# Patient Record
Sex: MALE | Race: OTHER | HISPANIC OR LATINO
[De-identification: names, ages, dates, MRNs, and addresses within clinical notes are randomized per-mention and may not be internally consistent; named-entity substitution may affect disease eponyms.]

---

## 2017-10-03 PROBLEM — Z00.00 ENCOUNTER FOR PREVENTIVE HEALTH EXAMINATION: Status: ACTIVE | Noted: 2017-10-03

## 2017-10-09 ENCOUNTER — APPOINTMENT (OUTPATIENT)
Dept: VASCULAR SURGERY | Facility: CLINIC | Age: 72
End: 2017-10-09
Payer: MEDICARE

## 2017-10-09 VITALS
HEIGHT: 63 IN | SYSTOLIC BLOOD PRESSURE: 130 MMHG | BODY MASS INDEX: 28.35 KG/M2 | DIASTOLIC BLOOD PRESSURE: 64 MMHG | WEIGHT: 160 LBS

## 2017-10-09 DIAGNOSIS — I83.90 ASYMPTOMATIC VARICOSE VEINS OF UNSPECIFIED LOWER EXTREMITY: ICD-10-CM

## 2017-10-09 DIAGNOSIS — Z87.891 PERSONAL HISTORY OF NICOTINE DEPENDENCE: ICD-10-CM

## 2017-10-09 PROCEDURE — 99213 OFFICE O/P EST LOW 20 MIN: CPT

## 2017-10-31 ENCOUNTER — OUTPATIENT (OUTPATIENT)
Dept: OUTPATIENT SERVICES | Facility: HOSPITAL | Age: 72
LOS: 1 days | Discharge: HOME | End: 2017-10-31

## 2017-10-31 DIAGNOSIS — M25.569 PAIN IN UNSPECIFIED KNEE: ICD-10-CM

## 2017-10-31 DIAGNOSIS — M54.2 CERVICALGIA: ICD-10-CM

## 2017-10-31 DIAGNOSIS — M25.519 PAIN IN UNSPECIFIED SHOULDER: ICD-10-CM

## 2017-11-06 ENCOUNTER — OUTPATIENT (OUTPATIENT)
Dept: OUTPATIENT SERVICES | Facility: HOSPITAL | Age: 72
LOS: 1 days | Discharge: HOME | End: 2017-11-06

## 2017-11-07 ENCOUNTER — OUTPATIENT (OUTPATIENT)
Dept: OUTPATIENT SERVICES | Facility: HOSPITAL | Age: 72
LOS: 1 days | Discharge: HOME | End: 2017-11-07

## 2017-11-07 DIAGNOSIS — E11.9 TYPE 2 DIABETES MELLITUS WITHOUT COMPLICATIONS: ICD-10-CM

## 2017-11-07 DIAGNOSIS — Z01.818 ENCOUNTER FOR OTHER PREPROCEDURAL EXAMINATION: ICD-10-CM

## 2017-11-07 DIAGNOSIS — I10 ESSENTIAL (PRIMARY) HYPERTENSION: ICD-10-CM

## 2017-11-07 DIAGNOSIS — Z87.891 PERSONAL HISTORY OF NICOTINE DEPENDENCE: ICD-10-CM

## 2017-11-07 DIAGNOSIS — D21.21 BENIGN NEOPLASM OF CONNECTIVE AND OTHER SOFT TISSUE OF RIGHT LOWER LIMB, INCLUDING HIP: ICD-10-CM

## 2017-11-07 DIAGNOSIS — D68.8 OTHER SPECIFIED COAGULATION DEFECTS: ICD-10-CM

## 2017-11-13 ENCOUNTER — OUTPATIENT (OUTPATIENT)
Dept: OUTPATIENT SERVICES | Facility: HOSPITAL | Age: 72
LOS: 1 days | Discharge: HOME | End: 2017-11-13

## 2017-11-13 DIAGNOSIS — C43.71 MALIGNANT MELANOMA OF RIGHT LOWER LIMB, INCLUDING HIP: ICD-10-CM

## 2017-11-13 DIAGNOSIS — E11.9 TYPE 2 DIABETES MELLITUS WITHOUT COMPLICATIONS: ICD-10-CM

## 2017-11-13 DIAGNOSIS — F17.210 NICOTINE DEPENDENCE, CIGARETTES, UNCOMPLICATED: ICD-10-CM

## 2017-11-13 DIAGNOSIS — I10 ESSENTIAL (PRIMARY) HYPERTENSION: ICD-10-CM

## 2017-11-13 DIAGNOSIS — L98.9 DISORDER OF THE SKIN AND SUBCUTANEOUS TISSUE, UNSPECIFIED: ICD-10-CM

## 2017-11-21 ENCOUNTER — APPOINTMENT (OUTPATIENT)
Dept: PLASTIC SURGERY | Facility: CLINIC | Age: 72
End: 2017-11-21
Payer: MEDICARE

## 2017-11-21 VITALS — WEIGHT: 164 LBS | BODY MASS INDEX: 28 KG/M2 | HEIGHT: 64 IN

## 2017-11-21 DIAGNOSIS — Z80.3 FAMILY HISTORY OF MALIGNANT NEOPLASM OF BREAST: ICD-10-CM

## 2017-11-21 DIAGNOSIS — Z87.39 PERSONAL HISTORY OF OTHER DISEASES OF THE MUSCULOSKELETAL SYSTEM AND CONNECTIVE TISSUE: ICD-10-CM

## 2017-11-21 PROCEDURE — 99203 OFFICE O/P NEW LOW 30 MIN: CPT

## 2017-11-22 ENCOUNTER — APPOINTMENT (OUTPATIENT)
Dept: SURGERY | Facility: CLINIC | Age: 72
End: 2017-11-22
Payer: MEDICARE

## 2017-11-22 VITALS
DIASTOLIC BLOOD PRESSURE: 70 MMHG | HEIGHT: 64 IN | SYSTOLIC BLOOD PRESSURE: 156 MMHG | BODY MASS INDEX: 28.85 KG/M2 | HEART RATE: 84 BPM | TEMPERATURE: 98.1 F | WEIGHT: 169 LBS

## 2017-11-22 DIAGNOSIS — Z86.39 PERSONAL HISTORY OF OTHER ENDOCRINE, NUTRITIONAL AND METABOLIC DISEASE: ICD-10-CM

## 2017-11-22 PROCEDURE — 99214 OFFICE O/P EST MOD 30 MIN: CPT

## 2017-11-23 ENCOUNTER — MOBILE ON CALL (OUTPATIENT)
Age: 72
End: 2017-11-23

## 2017-11-24 ENCOUNTER — MOBILE ON CALL (OUTPATIENT)
Age: 72
End: 2017-11-24

## 2017-11-30 ENCOUNTER — OUTPATIENT (OUTPATIENT)
Dept: OUTPATIENT SERVICES | Facility: HOSPITAL | Age: 72
LOS: 1 days | Discharge: HOME | End: 2017-11-30

## 2017-11-30 DIAGNOSIS — Z01.818 ENCOUNTER FOR OTHER PREPROCEDURAL EXAMINATION: ICD-10-CM

## 2017-11-30 DIAGNOSIS — C43.71 MALIGNANT MELANOMA OF RIGHT LOWER LIMB, INCLUDING HIP: ICD-10-CM

## 2017-11-30 DIAGNOSIS — Z02.9 ENCOUNTER FOR ADMINISTRATIVE EXAMINATIONS, UNSPECIFIED: ICD-10-CM

## 2017-12-01 DIAGNOSIS — C80.1 MALIGNANT (PRIMARY) NEOPLASM, UNSPECIFIED: ICD-10-CM

## 2017-12-01 DIAGNOSIS — E11.9 TYPE 2 DIABETES MELLITUS WITHOUT COMPLICATIONS: ICD-10-CM

## 2017-12-01 DIAGNOSIS — E78.00 PURE HYPERCHOLESTEROLEMIA, UNSPECIFIED: ICD-10-CM

## 2017-12-01 DIAGNOSIS — I10 ESSENTIAL (PRIMARY) HYPERTENSION: ICD-10-CM

## 2017-12-02 PROBLEM — Z86.39 HISTORY OF DIABETES MELLITUS: Status: RESOLVED | Noted: 2017-12-02 | Resolved: 2017-12-02

## 2017-12-14 ENCOUNTER — OUTPATIENT (OUTPATIENT)
Dept: OUTPATIENT SERVICES | Facility: HOSPITAL | Age: 72
LOS: 1 days | Discharge: HOME | End: 2017-12-14

## 2017-12-14 DIAGNOSIS — C43.71 MALIGNANT MELANOMA OF RIGHT LOWER LIMB, INCLUDING HIP: ICD-10-CM

## 2017-12-14 DIAGNOSIS — R59.9 ENLARGED LYMPH NODES, UNSPECIFIED: ICD-10-CM

## 2017-12-14 DIAGNOSIS — E11.9 TYPE 2 DIABETES MELLITUS WITHOUT COMPLICATIONS: ICD-10-CM

## 2017-12-14 DIAGNOSIS — I10 ESSENTIAL (PRIMARY) HYPERTENSION: ICD-10-CM

## 2017-12-20 ENCOUNTER — APPOINTMENT (OUTPATIENT)
Dept: SURGERY | Facility: CLINIC | Age: 72
End: 2017-12-20
Payer: MEDICARE

## 2017-12-20 VITALS
WEIGHT: 170 LBS | HEART RATE: 105 BPM | TEMPERATURE: 98.9 F | SYSTOLIC BLOOD PRESSURE: 162 MMHG | HEIGHT: 64 IN | DIASTOLIC BLOOD PRESSURE: 72 MMHG | BODY MASS INDEX: 29.02 KG/M2

## 2017-12-20 PROCEDURE — 99024 POSTOP FOLLOW-UP VISIT: CPT

## 2018-01-02 ENCOUNTER — APPOINTMENT (OUTPATIENT)
Dept: PLASTIC SURGERY | Facility: CLINIC | Age: 73
End: 2018-01-02
Payer: MEDICARE

## 2018-01-02 PROCEDURE — 99212 OFFICE O/P EST SF 10 MIN: CPT

## 2018-01-04 ENCOUNTER — OUTPATIENT (OUTPATIENT)
Dept: OUTPATIENT SERVICES | Facility: HOSPITAL | Age: 73
LOS: 1 days | Discharge: HOME | End: 2018-01-04

## 2018-01-04 DIAGNOSIS — S91.301D UNSPECIFIED OPEN WOUND, RIGHT FOOT, SUBSEQUENT ENCOUNTER: ICD-10-CM

## 2018-01-04 DIAGNOSIS — I44.1 ATRIOVENTRICULAR BLOCK, SECOND DEGREE: ICD-10-CM

## 2018-01-04 DIAGNOSIS — C43.71 MALIGNANT MELANOMA OF RIGHT LOWER LIMB, INCLUDING HIP: ICD-10-CM

## 2018-01-04 DIAGNOSIS — Z01.818 ENCOUNTER FOR OTHER PREPROCEDURAL EXAMINATION: ICD-10-CM

## 2018-01-11 ENCOUNTER — APPOINTMENT (OUTPATIENT)
Dept: PLASTIC SURGERY | Facility: CLINIC | Age: 73
End: 2018-01-11
Payer: MEDICARE

## 2018-01-11 ENCOUNTER — OUTPATIENT (OUTPATIENT)
Dept: OUTPATIENT SERVICES | Facility: HOSPITAL | Age: 73
LOS: 1 days | Discharge: HOME | End: 2018-01-11

## 2018-01-11 DIAGNOSIS — I44.1 ATRIOVENTRICULAR BLOCK, SECOND DEGREE: ICD-10-CM

## 2018-01-11 PROCEDURE — 15002 WOUND PREP TRK/ARM/LEG: CPT

## 2018-01-11 PROCEDURE — 15275 SKIN SUB GRAFT FACE/NK/HF/G: CPT | Mod: XE

## 2018-01-11 PROCEDURE — 97605 NEG PRS WND THER DME<=50SQCM: CPT | Mod: XE

## 2018-01-23 ENCOUNTER — APPOINTMENT (OUTPATIENT)
Dept: PLASTIC SURGERY | Facility: CLINIC | Age: 73
End: 2018-01-23
Payer: MEDICARE

## 2018-01-23 PROCEDURE — 99024 POSTOP FOLLOW-UP VISIT: CPT

## 2018-01-25 DIAGNOSIS — Z85.828 PERSONAL HISTORY OF OTHER MALIGNANT NEOPLASM OF SKIN: ICD-10-CM

## 2018-01-30 ENCOUNTER — APPOINTMENT (OUTPATIENT)
Dept: PLASTIC SURGERY | Facility: CLINIC | Age: 73
End: 2018-01-30
Payer: MEDICARE

## 2018-01-30 PROCEDURE — 99024 POSTOP FOLLOW-UP VISIT: CPT

## 2018-01-31 ENCOUNTER — INPATIENT (INPATIENT)
Facility: HOSPITAL | Age: 73
LOS: 1 days | Discharge: HOME | End: 2018-02-02
Attending: INTERNAL MEDICINE

## 2018-01-31 ENCOUNTER — APPOINTMENT (OUTPATIENT)
Dept: PLASTIC SURGERY | Facility: AMBULATORY SURGERY CENTER | Age: 73
End: 2018-01-31
Payer: MEDICARE

## 2018-01-31 PROCEDURE — 15002 WOUND PREP TRK/ARM/LEG: CPT | Mod: 58,59

## 2018-01-31 PROCEDURE — 15120 SPLT AGRFT F/S/N/H/F/G/M 1ST: CPT | Mod: 58

## 2018-02-04 DIAGNOSIS — I44.1 ATRIOVENTRICULAR BLOCK, SECOND DEGREE: ICD-10-CM

## 2018-02-06 DIAGNOSIS — C43.71 MALIGNANT MELANOMA OF RIGHT LOWER LIMB, INCLUDING HIP: ICD-10-CM

## 2018-02-06 DIAGNOSIS — I10 ESSENTIAL (PRIMARY) HYPERTENSION: ICD-10-CM

## 2018-02-06 DIAGNOSIS — I97.89 OTHER POSTPROCEDURAL COMPLICATIONS AND DISORDERS OF THE CIRCULATORY SYSTEM, NOT ELSEWHERE CLASSIFIED: ICD-10-CM

## 2018-02-06 DIAGNOSIS — I83.90 ASYMPTOMATIC VARICOSE VEINS OF UNSPECIFIED LOWER EXTREMITY: ICD-10-CM
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
## 2018-02-06 DIAGNOSIS — M19.90 UNSPECIFIED OSTEOARTHRITIS, UNSPECIFIED SITE: ICD-10-CM

## 2018-02-06 DIAGNOSIS — Z87.891 PERSONAL HISTORY OF NICOTINE DEPENDENCE: ICD-10-CM

## 2018-02-06 DIAGNOSIS — E11.9 TYPE 2 DIABETES MELLITUS WITHOUT COMPLICATIONS: ICD-10-CM

## 2018-02-06 DIAGNOSIS — I44.1 ATRIOVENTRICULAR BLOCK, SECOND DEGREE: ICD-10-CM

## 2018-02-07 ENCOUNTER — APPOINTMENT (OUTPATIENT)
Dept: PLASTIC SURGERY | Facility: CLINIC | Age: 73
End: 2018-02-07
Payer: MEDICARE

## 2018-02-07 PROCEDURE — 99024 POSTOP FOLLOW-UP VISIT: CPT

## 2018-02-12 DIAGNOSIS — I44.1 ATRIOVENTRICULAR BLOCK, SECOND DEGREE: ICD-10-CM

## 2018-02-12 DIAGNOSIS — Z79.84 LONG TERM (CURRENT) USE OF ORAL HYPOGLYCEMIC DRUGS: ICD-10-CM

## 2018-02-15 ENCOUNTER — APPOINTMENT (OUTPATIENT)
Dept: PLASTIC SURGERY | Facility: CLINIC | Age: 73
End: 2018-02-15
Payer: MEDICARE

## 2018-02-15 PROCEDURE — 99024 POSTOP FOLLOW-UP VISIT: CPT

## 2018-03-05 ENCOUNTER — APPOINTMENT (OUTPATIENT)
Dept: CARDIOLOGY | Facility: CLINIC | Age: 73
End: 2018-03-05

## 2018-03-05 VITALS
OXYGEN SATURATION: 97 % | WEIGHT: 170 LBS | HEART RATE: 83 BPM | SYSTOLIC BLOOD PRESSURE: 151 MMHG | DIASTOLIC BLOOD PRESSURE: 73 MMHG | BODY MASS INDEX: 29.18 KG/M2

## 2018-03-05 RX ORDER — ASPIRIN 81 MG
81 TABLET, DELAYED RELEASE (ENTERIC COATED) ORAL
Refills: 0 | Status: DISCONTINUED | COMMUNITY
End: 2018-03-05

## 2018-03-09 ENCOUNTER — APPOINTMENT (OUTPATIENT)
Dept: PLASTIC SURGERY | Facility: CLINIC | Age: 73
End: 2018-03-09
Payer: MEDICARE

## 2018-03-09 PROCEDURE — 99024 POSTOP FOLLOW-UP VISIT: CPT

## 2018-03-14 ENCOUNTER — APPOINTMENT (OUTPATIENT)
Dept: CARDIOLOGY | Facility: CLINIC | Age: 73
End: 2018-03-14

## 2018-03-14 VITALS
SYSTOLIC BLOOD PRESSURE: 149 MMHG | HEIGHT: 64 IN | HEART RATE: 96 BPM | BODY MASS INDEX: 29.02 KG/M2 | WEIGHT: 170 LBS | DIASTOLIC BLOOD PRESSURE: 75 MMHG | OXYGEN SATURATION: 97 %

## 2018-04-10 ENCOUNTER — APPOINTMENT (OUTPATIENT)
Dept: PLASTIC SURGERY | Facility: CLINIC | Age: 73
End: 2018-04-10
Payer: MEDICARE

## 2018-04-10 PROCEDURE — 99024 POSTOP FOLLOW-UP VISIT: CPT

## 2018-04-11 ENCOUNTER — LABORATORY RESULT (OUTPATIENT)
Age: 73
End: 2018-04-11

## 2018-04-11 ENCOUNTER — APPOINTMENT (OUTPATIENT)
Dept: SURGERY | Facility: CLINIC | Age: 73
End: 2018-04-11
Payer: MEDICARE

## 2018-04-11 ENCOUNTER — OUTPATIENT (OUTPATIENT)
Dept: OUTPATIENT SERVICES | Facility: HOSPITAL | Age: 73
LOS: 1 days | Discharge: HOME | End: 2018-04-11

## 2018-04-11 VITALS
TEMPERATURE: 98.8 F | BODY MASS INDEX: 29.71 KG/M2 | DIASTOLIC BLOOD PRESSURE: 68 MMHG | WEIGHT: 174 LBS | SYSTOLIC BLOOD PRESSURE: 158 MMHG | HEIGHT: 64 IN | HEART RATE: 112 BPM

## 2018-04-11 DIAGNOSIS — C43.71 MALIGNANT MELANOMA OF RIGHT LOWER LIMB, INCLUDING HIP: ICD-10-CM

## 2018-04-11 PROCEDURE — 99213 OFFICE O/P EST LOW 20 MIN: CPT

## 2018-04-13 LAB
ALBUMIN SERPL ELPH-MCNC: 4.5 G/DL
ALP BLD-CCNC: 63 U/L
ALT SERPL-CCNC: 14 U/L
ANION GAP SERPL CALC-SCNC: 13 MMOL/L
AST SERPL-CCNC: 14 U/L
BILIRUB SERPL-MCNC: 0.3 MG/DL
BUN SERPL-MCNC: 21 MG/DL
CALCIUM SERPL-MCNC: 9.9 MG/DL
CHLORIDE SERPL-SCNC: 94 MMOL/L
CO2 SERPL-SCNC: 31 MMOL/L
CREAT SERPL-MCNC: 1 MG/DL
GLUCOSE SERPL-MCNC: 274 MG/DL
LDH SERPL-CCNC: 206 U/L
POTASSIUM SERPL-SCNC: 4.3 MMOL/L
PROT SERPL-MCNC: 7.2 G/DL
SODIUM SERPL-SCNC: 138 MMOL/L

## 2018-04-16 ENCOUNTER — APPOINTMENT (OUTPATIENT)
Dept: CARDIOLOGY | Facility: CLINIC | Age: 73
End: 2018-04-16

## 2018-10-16 ENCOUNTER — APPOINTMENT (OUTPATIENT)
Dept: CARDIOLOGY | Facility: CLINIC | Age: 73
End: 2018-10-16

## 2018-10-16 ENCOUNTER — APPOINTMENT (OUTPATIENT)
Dept: PLASTIC SURGERY | Facility: CLINIC | Age: 73
End: 2018-10-16
Payer: MEDICARE

## 2018-10-16 VITALS
DIASTOLIC BLOOD PRESSURE: 66 MMHG | HEART RATE: 79 BPM | BODY MASS INDEX: 29.88 KG/M2 | OXYGEN SATURATION: 97 % | SYSTOLIC BLOOD PRESSURE: 116 MMHG | WEIGHT: 175 LBS | HEIGHT: 64 IN

## 2018-10-16 PROCEDURE — 99212 OFFICE O/P EST SF 10 MIN: CPT

## 2018-10-16 RX ORDER — CELECOXIB 200 MG/1
200 CAPSULE ORAL
Refills: 0 | Status: DISCONTINUED | COMMUNITY
End: 2018-10-16

## 2018-10-17 ENCOUNTER — APPOINTMENT (OUTPATIENT)
Dept: SURGERY | Facility: CLINIC | Age: 73
End: 2018-10-17
Payer: MEDICARE

## 2018-10-17 VITALS
SYSTOLIC BLOOD PRESSURE: 132 MMHG | DIASTOLIC BLOOD PRESSURE: 84 MMHG | TEMPERATURE: 98.1 F | HEIGHT: 64 IN | BODY MASS INDEX: 28.85 KG/M2 | HEART RATE: 97 BPM | WEIGHT: 169 LBS

## 2018-10-17 PROCEDURE — 99213 OFFICE O/P EST LOW 20 MIN: CPT

## 2018-10-18 ENCOUNTER — OUTPATIENT (OUTPATIENT)
Dept: OUTPATIENT SERVICES | Facility: HOSPITAL | Age: 73
LOS: 1 days | Discharge: HOME | End: 2018-10-18

## 2018-10-18 ENCOUNTER — LABORATORY RESULT (OUTPATIENT)
Age: 73
End: 2018-10-18

## 2018-10-18 DIAGNOSIS — E11.9 TYPE 2 DIABETES MELLITUS WITHOUT COMPLICATIONS: ICD-10-CM

## 2018-10-18 DIAGNOSIS — C43.71 MALIGNANT MELANOMA OF RIGHT LOWER LIMB, INCLUDING HIP: ICD-10-CM

## 2018-10-19 LAB
ALBUMIN SERPL ELPH-MCNC: 4.4 G/DL
ALP BLD-CCNC: 57 U/L
ALT SERPL-CCNC: 17 U/L
ANION GAP SERPL CALC-SCNC: 16 MMOL/L
AST SERPL-CCNC: 16 U/L
BILIRUB SERPL-MCNC: 0.3 MG/DL
BUN SERPL-MCNC: 15 MG/DL
CALCIUM SERPL-MCNC: 9.3 MG/DL
CHLORIDE SERPL-SCNC: 98 MMOL/L
CO2 SERPL-SCNC: 25 MMOL/L
CREAT SERPL-MCNC: 0.9 MG/DL
GLUCOSE SERPL-MCNC: 170 MG/DL
LDH SERPL-CCNC: 254 U/L
POTASSIUM SERPL-SCNC: 4.5 MMOL/L
PROT SERPL-MCNC: 7.5 G/DL
SODIUM SERPL-SCNC: 139 MMOL/L

## 2018-10-23 ENCOUNTER — OUTPATIENT (OUTPATIENT)
Dept: OUTPATIENT SERVICES | Facility: HOSPITAL | Age: 73
LOS: 1 days | Discharge: HOME | End: 2018-10-23

## 2018-10-23 DIAGNOSIS — Z12.31 ENCOUNTER FOR SCREENING MAMMOGRAM FOR MALIGNANT NEOPLASM OF BREAST: ICD-10-CM

## 2018-10-24 DIAGNOSIS — M89.9 DISORDER OF BONE, UNSPECIFIED: ICD-10-CM

## 2018-10-24 DIAGNOSIS — Z13.820 ENCOUNTER FOR SCREENING FOR OSTEOPOROSIS: ICD-10-CM

## 2018-11-05 ENCOUNTER — FORM ENCOUNTER (OUTPATIENT)
Age: 73
End: 2018-11-05

## 2018-11-06 ENCOUNTER — OUTPATIENT (OUTPATIENT)
Dept: OUTPATIENT SERVICES | Facility: HOSPITAL | Age: 73
LOS: 1 days | Discharge: HOME | End: 2018-11-06

## 2018-11-06 DIAGNOSIS — C43.71 MALIGNANT MELANOMA OF RIGHT LOWER LIMB, INCLUDING HIP: ICD-10-CM

## 2018-11-06 LAB — GLUCOSE BLDC GLUCOMTR-MCNC: 152 MG/DL — HIGH (ref 70–99)

## 2019-05-15 ENCOUNTER — APPOINTMENT (OUTPATIENT)
Dept: SURGERY | Facility: CLINIC | Age: 74
End: 2019-05-15
Payer: MEDICARE

## 2019-05-15 ENCOUNTER — OUTPATIENT (OUTPATIENT)
Dept: OUTPATIENT SERVICES | Facility: HOSPITAL | Age: 74
LOS: 1 days | Discharge: HOME | End: 2019-05-15

## 2019-05-15 VITALS
DIASTOLIC BLOOD PRESSURE: 66 MMHG | WEIGHT: 164 LBS | BODY MASS INDEX: 28 KG/M2 | HEART RATE: 99 BPM | TEMPERATURE: 97.7 F | HEIGHT: 64 IN | SYSTOLIC BLOOD PRESSURE: 138 MMHG

## 2019-05-15 DIAGNOSIS — C43.71 MALIGNANT MELANOMA OF RIGHT LOWER LIMB, INCLUDING HIP: ICD-10-CM

## 2019-05-15 PROCEDURE — 99214 OFFICE O/P EST MOD 30 MIN: CPT

## 2019-05-16 ENCOUNTER — APPOINTMENT (OUTPATIENT)
Dept: PLASTIC SURGERY | Facility: CLINIC | Age: 74
End: 2019-05-16
Payer: MEDICARE

## 2019-05-16 LAB
ALBUMIN SERPL ELPH-MCNC: 4.4 G/DL
ALP BLD-CCNC: 60 U/L
ALT SERPL-CCNC: 26 U/L
ANION GAP SERPL CALC-SCNC: 15 MMOL/L
AST SERPL-CCNC: 22 U/L
BILIRUB SERPL-MCNC: 0.2 MG/DL
BUN SERPL-MCNC: 18 MG/DL
CALCIUM SERPL-MCNC: 9.1 MG/DL
CHLORIDE SERPL-SCNC: 102 MMOL/L
CO2 SERPL-SCNC: 24 MMOL/L
CREAT SERPL-MCNC: 0.9 MG/DL
GLUCOSE SERPL-MCNC: 192 MG/DL
LDH SERPL-CCNC: 209 U/L
POTASSIUM SERPL-SCNC: 4.4 MMOL/L
PROT SERPL-MCNC: 6.9 G/DL
SODIUM SERPL-SCNC: 141 MMOL/L

## 2019-05-16 PROCEDURE — 99212 OFFICE O/P EST SF 10 MIN: CPT

## 2019-05-16 NOTE — ASSESSMENT
[FreeTextEntry1] : Here in followup s/p right heel reconstruction after excision of melanoma w Viv Dec 2017\par \par graft healed--small area epidermal cracking approx 3mm\par getting diabetic shoes w Mura later today\par \par suggest medihoney to small opening rigth heel\par no acute plastic surgery issues\par f/u in 6 months

## 2019-05-18 NOTE — PHYSICAL EXAM
[Normal] : supple, no neck mass and thyroid not enlarged [Normal Neck Lymph Nodes] : normal neck lymph nodes  [Normal] : grossly intact [Normal Supraclavicular Lymph Nodes] : normal supraclavicular lymph nodes [de-identified] : right groin LNs, small, rubbery,  Appeared slightly larger than before. [de-identified] : Right heel wound well-healed after skin graft. No evidence of recurrence. Right groin scar well-healed. No intransit mets or satellitosis.

## 2019-05-18 NOTE — ASSESSMENT
[FreeTextEntry1] : 75 yo male patient who comes today after he underwent excisional biopsy of a right foot hypermelanotic skin lesion. The final pathology report revealed a pT3b, pNx 2.5 mm thick, with ulceration, acrolentiginous melanoma of the heel, resected with inadequate margins. He underwent wide excision of melanoma of his right heel on December 14, 2017 with negative margins of resection. We performed a SLNB of his right groin and both lymph nodes were negative for tumor. He comes today for followup and he refers minor discomfort in his foot when he walks.\par \par Assessment and Plan:\par \par s/p wide excision of right heel 2.5 mm thick melanoma, R0 resection.\par S/p STSG by Dr Gonzalez.\par \par Currently FLORENTIN, clinically.\par Imaging last year negative, CT-PET in November 2019, negative. LDH was 254.\par LDH and CMP ordered.\par US groin ordered.\par \par Return in 6 months for followup.\par \par All the questions were answered to their satisfaction and I encourage the patient to call my office at anytime if he had any questions. Plan of care fully discussed with the patient.\par

## 2019-05-18 NOTE — HISTORY OF PRESENT ILLNESS
[de-identified] : 73 yo male patient who comes today after he underwent excisional biopsy of a right foot hypermelanotic skin lesion. The final pathology report revealed a pT3b, pNx 2.5 mm thick, with ulceration, acrolentiginous melanoma of the heel, resected with inadequate margins. He underwent wide excision of melanoma of his right heel on December 14, 2017 with negative margins of resection. We performed a SLNB of his right groin and both lymph nodes were negative for tumor. He comes today for followup and he refers minor discomfort in his foot when he walks.

## 2019-05-20 ENCOUNTER — APPOINTMENT (OUTPATIENT)
Dept: CARDIOLOGY | Facility: CLINIC | Age: 74
End: 2019-05-20
Payer: MEDICARE

## 2019-05-20 ENCOUNTER — OUTPATIENT (OUTPATIENT)
Dept: OUTPATIENT SERVICES | Facility: HOSPITAL | Age: 74
LOS: 1 days | Discharge: HOME | End: 2019-05-20
Payer: MEDICARE

## 2019-05-20 VITALS
HEIGHT: 64 IN | DIASTOLIC BLOOD PRESSURE: 75 MMHG | WEIGHT: 164 LBS | HEART RATE: 68 BPM | BODY MASS INDEX: 28 KG/M2 | OXYGEN SATURATION: 98 % | SYSTOLIC BLOOD PRESSURE: 144 MMHG

## 2019-05-20 DIAGNOSIS — I44.0 ATRIOVENTRICULAR BLOCK, FIRST DEGREE: ICD-10-CM

## 2019-05-20 DIAGNOSIS — M25.511 PAIN IN RIGHT SHOULDER: ICD-10-CM

## 2019-05-20 DIAGNOSIS — M25.512 PAIN IN LEFT SHOULDER: ICD-10-CM

## 2019-05-20 DIAGNOSIS — E11.9 TYPE 2 DIABETES MELLITUS W/OUT COMPLICATIONS: ICD-10-CM

## 2019-05-20 DIAGNOSIS — C43.71 MALIGNANT MELANOMA OF RIGHT LOWER LIMB, INCLUDING HIP: ICD-10-CM

## 2019-05-20 PROCEDURE — 73030 X-RAY EXAM OF SHOULDER: CPT | Mod: 26,50

## 2019-05-20 PROCEDURE — 93000 ELECTROCARDIOGRAM COMPLETE: CPT | Mod: 59

## 2019-05-20 PROCEDURE — 99213 OFFICE O/P EST LOW 20 MIN: CPT

## 2019-05-20 PROCEDURE — 76856 US EXAM PELVIC COMPLETE: CPT | Mod: 26

## 2019-05-20 PROCEDURE — 93280 PM DEVICE PROGR EVAL DUAL: CPT

## 2019-07-17 PROBLEM — I44.0 FIRST DEGREE AV BLOCK: Status: ACTIVE | Noted: 2019-07-17

## 2019-07-17 PROBLEM — E11.9 DIABETES: Status: ACTIVE | Noted: 2017-10-09

## 2019-07-17 NOTE — PHYSICAL EXAM
[General Appearance - Well Developed] : well developed [Normal Appearance] : normal appearance [Well Groomed] : well groomed [General Appearance - Well Nourished] : well nourished [No Deformities] : no deformities [General Appearance - In No Acute Distress] : no acute distress [Heart Rate And Rhythm] : heart rate and rhythm were normal [Heart Sounds] : normal S1 and S2 [Murmurs] : no murmurs present [Respiration, Rhythm And Depth] : normal respiratory rhythm and effort [Exaggerated Use Of Accessory Muscles For Inspiration] : no accessory muscle use [Auscultation Breath Sounds / Voice Sounds] : lungs were clear to auscultation bilaterally [Left Infraclavicular] : left infraclavicular area [Clean] : clean [Dry] : dry [Well-Healed] : well-healed [Abdomen Soft] : soft [Abdomen Tenderness] : non-tender [] : no hepato-splenomegaly [Abdomen Mass (___ Cm)] : no abdominal mass palpated [Nail Clubbing] : no clubbing of the fingernails [Cyanosis, Localized] : no localized cyanosis [Petechial Hemorrhages (___cm)] : no petechial hemorrhages

## 2019-07-17 NOTE — DISCUSSION/SUMMARY
[FreeTextEntry1] : Mr. 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 He has minimal symptoms. He is not on AV guillermo agents and he has no apparent reversible cause of AV block. He has normal LV function on echo. He underwent the placement of a dual chamber pacemaker and removal of implantable loop recorder on 2/1/2018. \par \par Deice interrogation shows PAF. His CHADSVASC score is 4. I recommend to continue him on Eliquis. I explained rationale for anticoagulation, risk ans benefits. Patient expressed understanding and agreement with plan of care. PAtient has no evidence of bleeding. \par \par I interrogated and reprogrammed his device as described in procedure. His wound is healing properly, with no signs of inflammation or bleeding. I discussed with patient post-operative care in great details. I answered all his questions to his satisfaction. Patient was pleased with the result of his procedure. \par \par Patient is travelling to [facility] for 6-8 months; There will be no remote transmissions for that period.\par \par Patient will follow with me in 6 months’ time. Please do not hesitate to contact me at [phone] if you have any further questions regarding this patient care.\par

## 2019-07-17 NOTE — PROCEDURE
[No] : not [NSR] : normal sinus rhythm [Pacemaker] : pacemaker [DDI] : DDI [Voltage: ___ volts] : Voltage was [unfilled] volts [Magnet Rate: ___ Ppm] : magnet rate was [unfilled] Ppm [Longevity: ___ months] : The estimated remaining battery life is [unfilled] months [Normal] : The battery status is normal. [Threshold Testing Performed] : Threshold testing was performed [Ventricular] : Ventricular [Lead Imp:  ___ohms] : lead impedance was [unfilled] ohms [Sensing Amplitude ___mv] : sensing amplitude was [unfilled] mv [___V @] : [unfilled] V [___ ms] : [unfilled] ms [Asense-Vsense ___ %] : Asense-Vsense [unfilled]% [Asense-Vpace ___ %] : Asense-Vpace [unfilled]% [Apace-Vsense ___ %] : Apace-Vsense [unfilled]% [Apace-Vpace ___ %] : Apace-Vpace [unfilled]% [Programmed for Longevity] : output reprogrammed for improved battery longevity [de-identified] : ABBOTT [de-identified] : [id number] [de-identified] : [id number] [de-identified] : 2/1/2018 [de-identified] : 60 [de-identified] : A SENSITIVITY TO 0.5 mV, A-CAP CONFIRM OFF, MAX TRACK RATE , DDI ON AMS, AMS BASE RATE TO 60, PVAB , PVARP , SENSED AV DELAY . [de-identified] : FAR FIELD R-WAVE SENSING NOTED IN ATRIAL LEAD. \par >16 EPISODES OF AF, LONGEST 1 DAY 7 HRS.: \par NORMAL DDD PPM SYSTEM FUNCTION TODAY.

## 2019-07-17 NOTE — HISTORY OF PRESENT ILLNESS
[Palpitations] : no palpitations [SOB] : no dyspnea [Syncope] : no syncope [Dizziness] : no dizziness [Chest Pain] : no chest pain or discomfort [Shoulder Pain] : no shoulder pain [Pain at Site] : no pain at device site [Erythema at Site] : no erythema at device site [Swelling at Site] : no swelling at device site [de-identified] : \par \par Referring Physician: Dr. New Franco\par \par \par CARDIAC TESTING:\par ECG (5/20/2019): sinus rhythm, first degree av block, intermittent RV pacing\par ECG (3/5/2018): sinus rhythm at 84 bm, 1st degree AV block, [place]>400 ms

## 2019-08-07 ENCOUNTER — APPOINTMENT (OUTPATIENT)
Dept: SURGERY | Facility: CLINIC | Age: 74
End: 2019-08-07

## 2019-09-12 ENCOUNTER — APPOINTMENT (OUTPATIENT)
Dept: CARDIOLOGY | Facility: CLINIC | Age: 74
End: 2019-09-12

## 2019-12-09 ENCOUNTER — APPOINTMENT (OUTPATIENT)
Dept: CARDIOLOGY | Facility: CLINIC | Age: 74
End: 2019-12-09

## 2020-03-02 ENCOUNTER — APPOINTMENT (OUTPATIENT)
Dept: SURGERY | Facility: CLINIC | Age: 75
End: 2020-03-02
Payer: MEDICARE

## 2020-03-02 VITALS
DIASTOLIC BLOOD PRESSURE: 78 MMHG | WEIGHT: 160 LBS | SYSTOLIC BLOOD PRESSURE: 144 MMHG | TEMPERATURE: 97.9 F | HEART RATE: 77 BPM | BODY MASS INDEX: 27.31 KG/M2 | HEIGHT: 64 IN

## 2020-03-02 PROCEDURE — 99214 OFFICE O/P EST MOD 30 MIN: CPT

## 2020-03-03 ENCOUNTER — APPOINTMENT (OUTPATIENT)
Dept: PLASTIC SURGERY | Facility: CLINIC | Age: 75
End: 2020-03-03
Payer: MEDICARE

## 2020-03-03 PROCEDURE — 99212 OFFICE O/P EST SF 10 MIN: CPT

## 2020-03-03 NOTE — ASSESSMENT
[FreeTextEntry1] : Here in followup s/p right heel reconstruction after excision of melanoma w Viv Dec 2017\par \par graft healed--small area epidermal cracking approx 3mm\par getting diabetic shoes w Mura later today\par \par suggest medihoney to small opening rigth heel\par no acute plastic surgery issues\par f/u in 6 months\par \par as above\par right heel fine, well healed STSG over Interga 2 yrs postop\par has firmness and tenderness over distal lateral rigth calf\par no popliteal or inguinal LAD\par \par Antwan ordered CT scan yesterday\par recently saw Viv\par \par pt is to f/u w Antwan re CT results

## 2020-03-06 LAB
ALBUMIN SERPL ELPH-MCNC: 4.3 G/DL
ALP BLD-CCNC: 70 U/L
ALT SERPL-CCNC: 14 U/L
ANION GAP SERPL CALC-SCNC: 10 MMOL/L
AST SERPL-CCNC: 15 U/L
BASOPHILS # BLD AUTO: 0.04 K/UL
BASOPHILS NFR BLD AUTO: 0.5 %
BILIRUB SERPL-MCNC: 0.3 MG/DL
BUN SERPL-MCNC: 12 MG/DL
CALCIUM SERPL-MCNC: 9.3 MG/DL
CHLORIDE SERPL-SCNC: 101 MMOL/L
CO2 SERPL-SCNC: 29 MMOL/L
CREAT SERPL-MCNC: 0.8 MG/DL
EOSINOPHIL # BLD AUTO: 0.1 K/UL
EOSINOPHIL NFR BLD AUTO: 1.2 %
GLUCOSE SERPL-MCNC: 206 MG/DL
HCT VFR BLD CALC: 42.4 %
HGB BLD-MCNC: 13.7 G/DL
IMM GRANULOCYTES NFR BLD AUTO: 0.4 %
LDH SERPL-CCNC: 180 U/L
LYMPHOCYTES # BLD AUTO: 2.57 K/UL
LYMPHOCYTES NFR BLD AUTO: 30.7 %
MAN DIFF?: NORMAL
MCHC RBC-ENTMCNC: 30.8 PG
MCHC RBC-ENTMCNC: 32.3 G/DL
MCV RBC AUTO: 95.3 FL
MONOCYTES # BLD AUTO: 0.62 K/UL
MONOCYTES NFR BLD AUTO: 7.4 %
NEUTROPHILS # BLD AUTO: 5 K/UL
NEUTROPHILS NFR BLD AUTO: 59.8 %
PLATELET # BLD AUTO: 256 K/UL
POTASSIUM SERPL-SCNC: 4.2 MMOL/L
PROT SERPL-MCNC: 6.9 G/DL
RBC # BLD: 4.45 M/UL
RBC # FLD: 12.8 %
SODIUM SERPL-SCNC: 140 MMOL/L
WBC # FLD AUTO: 8.36 K/UL

## 2020-03-06 NOTE — ASSESSMENT
[FreeTextEntry1] : 73 yo male patient who comes today after he underwent excisional biopsy of a right foot hypermelanotic skin lesion. The final pathology report revealed a pT3b, pNx 2.5 mm thick, with ulceration, acrolentiginous melanoma of the heel, resected with inadequate margins. He underwent wide excision of melanoma of his right heel on December 14, 2017 with negative margins of resection. We performed a SLNB of his right groin and both lymph nodes were negative for tumor. He comes today for followup and he refers minor discomfort in his foot when he walks. Today he complains of pain in his distal right leg, denies any other symptoms.\par \par Assessment and Plan:\par \par s/p wide excision of right heel 2.5 mm thick melanoma, R0 resection. S/p STSG by Dr Gonzalez.\par Currently FLORENTIN, clinically.\par \par CT-PET in November 2018, negative. LDH was normal May 2019..\par CBC, LDH and CMP ordered.\par Referred to podiatry.\par Return in 6 months for followup.\par \par All the questions were answered to their satisfaction and I encourage the patient to call my office at anytime if he had any questions. Plan of care fully discussed with the patient.\par

## 2020-03-06 NOTE — PHYSICAL EXAM
[Normal] : supple, no neck mass and thyroid not enlarged [Normal Neck Lymph Nodes] : normal neck lymph nodes  [Normal Supraclavicular Lymph Nodes] : normal supraclavicular lymph nodes [Normal] : oriented to person, place and time, with appropriate affect [de-identified] : right groin LNs, small, rubbery. [de-identified] : Tenderness in distal third of his right leg, fullness, no solid mass. [de-identified] : Right heel wound well-healed after skin graft. No evidence of recurrence. Right groin scar well-healed. No intransit mets or satellitosis.

## 2020-03-09 ENCOUNTER — APPOINTMENT (OUTPATIENT)
Dept: CARDIOLOGY | Facility: CLINIC | Age: 75
End: 2020-03-09
Payer: MEDICARE

## 2020-03-09 VITALS
WEIGHT: 160 LBS | DIASTOLIC BLOOD PRESSURE: 72 MMHG | TEMPERATURE: 98.6 F | HEIGHT: 64 IN | BODY MASS INDEX: 27.31 KG/M2 | HEART RATE: 77 BPM | SYSTOLIC BLOOD PRESSURE: 149 MMHG

## 2020-03-09 PROCEDURE — 93280 PM DEVICE PROGR EVAL DUAL: CPT

## 2020-03-09 PROCEDURE — 99213 OFFICE O/P EST LOW 20 MIN: CPT

## 2020-03-09 PROCEDURE — 93000 ELECTROCARDIOGRAM COMPLETE: CPT | Mod: 59

## 2020-03-09 RX ORDER — NIFEDIPINE 60 MG/1
60 TABLET, FILM COATED, EXTENDED RELEASE ORAL
Refills: 0 | Status: COMPLETED | COMMUNITY
End: 2020-03-09

## 2020-03-09 RX ORDER — INSULIN GLARGINE 100 [IU]/ML
INJECTION, SOLUTION SUBCUTANEOUS
Refills: 0 | Status: DISCONTINUED | COMMUNITY
End: 2020-03-09

## 2020-03-12 ENCOUNTER — APPOINTMENT (OUTPATIENT)
Dept: CARDIOLOGY | Facility: CLINIC | Age: 75
End: 2020-03-12

## 2020-04-08 NOTE — PHYSICAL EXAM
[General Appearance - Well Developed] : well developed [Normal Appearance] : normal appearance [Well Groomed] : well groomed [General Appearance - Well Nourished] : well nourished [No Deformities] : no deformities [General Appearance - In No Acute Distress] : no acute distress [Heart Rate And Rhythm] : heart rate and rhythm were normal [Heart Sounds] : normal S1 and S2 [Murmurs] : no murmurs present [Respiration, Rhythm And Depth] : normal respiratory rhythm and effort [Exaggerated Use Of Accessory Muscles For Inspiration] : no accessory muscle use [Auscultation Breath Sounds / Voice Sounds] : lungs were clear to auscultation bilaterally [Left Infraclavicular] : left infraclavicular area [Clean] : clean [Dry] : dry [Well-Healed] : well-healed [Abdomen Soft] : soft [Abdomen Tenderness] : non-tender [] : no hepato-splenomegaly [Abdomen Mass (___ Cm)] : no abdominal mass palpated [Nail Clubbing] : no clubbing of the fingernails [Cyanosis, Localized] : no localized cyanosis [Petechial Hemorrhages (___cm)] : no petechial hemorrhages [Normal Conjunctiva] : the conjunctiva exhibited no abnormalities [Eyelids - No Xanthelasma] : the eyelids demonstrated no xanthelasmas [Normal Oral Mucosa] : normal oral mucosa [No Oral Pallor] : no oral pallor [No Oral Cyanosis] : no oral cyanosis [Abnormal Walk] : normal gait [Gait - Sufficient For Exercise Testing] : the gait was sufficient for exercise testing [Normal Jugular Venous A Waves Present] : normal jugular venous A waves present [Normal Jugular Venous V Waves Present] : normal jugular venous V waves present [No Jugular Venous Nair A Waves] : no jugular venous nair A waves

## 2020-04-08 NOTE — DISCUSSION/SUMMARY
[FreeTextEntry1] : Mr. 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 He has minimal symptoms. He is not on AV guillermo agents and he has no apparent reversible cause of AV block. He has normal LV function on echo. He underwent the placement of a dual chamber pacemaker and removal of implantable loop recorder on 2/1/2018. \par \par Deice interrogation: Normal Dual Chamber PPM function. Episodes  PAF. His CHADSVASC score is 4. \par He is on Eliquis. Denies s/s bleeding .  % d/t severely prolonged CO interval >350 ms . AS delay reprogrammed to 150 ms \par He is enrolled in remote  monitoring services and transmitting appropriately.  \par \par Reports c/w meds . On Eliquis for stroke prevention . Denies any s/s bleeding. \par  \par \par Plan \par -Continue current medication regimen \par -Continue remote monitoring  \par -RTO in 9 months \par -ECHO as scheduled with Dr. Franco \par \par \par Please do not hesitate to contact us at [phone] if you have any further questions regarding this patient care.\par

## 2020-04-08 NOTE — REASON FOR VISIT
[Family Member] : family member [Follow-up Device Check] : follow-up device check visit [Arrhythmias (seen on stored data)] : arrhythmias seen on stored data

## 2020-04-08 NOTE — END OF VISIT
[FreeTextEntry3] : I was present with the NP/PA during the history and exam of the patient. I discussed patient's management with her in details.I reviewed the NP’s note and agree with the documented findings and plan of care. I would like to take this opportunity to thank you for involving me in this patient care. Please do not hesitate to contact me if you have any further questions at [phone].\par

## 2020-04-08 NOTE — HISTORY OF PRESENT ILLNESS
[Palpitations] : no palpitations [SOB] : no dyspnea [Syncope] : no syncope [Dizziness] : no dizziness [Chest Pain] : no chest pain or discomfort [Shoulder Pain] : no shoulder pain [Pain at Site] : no pain at device site [Erythema at Site] : no erythema at device site [Swelling at Site] : no swelling at device site [de-identified] : Referring Physician: Dr. New Franco\par \par Returning for routine device interrogation . \par \par Feels well, no CP/SOB or palpitations . NO dizziness or syncope . Stable activity tolerance. \par \par CARDIAC TESTING:\par ECG ( 3/9/2020) ; SR at 77 bpm, RV paced  \par ECG (5/20/2019): sinus rhythm, first degree av block, intermittent RV pacing\par ECG (3/5/2018): sinus rhythm at 84 bm, 1st degree AV block, [place]>400 ms

## 2020-04-08 NOTE — PROCEDURE
[DDD] : DDD [Voltage: ___ volts] : Voltage was [unfilled] volts [Magnet Rate: ___ Ppm] : magnet rate was [unfilled] Ppm [Longevity: ___ months] : The estimated remaining battery life is [unfilled] months [Normal] : The battery status is normal. [Threshold Testing Performed] : Threshold testing was performed [Lead Imp:  ___ohms] : lead impedance was [unfilled] ohms [Sensing Amplitude ___mv] : sensing amplitude was [unfilled] mv [___V @] : [unfilled] V [___ ms] : [unfilled] ms [Counters Reset] : the counters were reset [Asense-Vsense ___ %] : Asense-Vsense [unfilled]% [Asense-Vpace ___ %] : Asense-Vpace [unfilled]% [Apace-Vsense ___ %] : Apace-Vsense [unfilled]% [Apace-Vpace ___ %] : Apace-Vpace [unfilled]% [No] : not [See Scanned Paceart Report] : See scanned paceart report [See Device Printout] : See device printout [Programmed for Longevity] : output reprogrammed for improved battery longevity [de-identified] : 1AV block  [de-identified] : [facility] [de-identified] : [id number] [de-identified] : [id number] [de-identified] : 02/01/2018 [de-identified] :  [de-identified] : 6 AMS episodes longest duration 15 hrs 32 mins on 6/5/2019. Normal function of device system. RV paced 100 %, CA interval >400 ms.  AS delay reprogrammed to 150 ms. \par  \par

## 2020-06-09 ENCOUNTER — APPOINTMENT (OUTPATIENT)
Dept: CARDIOLOGY | Facility: CLINIC | Age: 75
End: 2020-06-09
Payer: MEDICARE

## 2020-06-09 PROCEDURE — 93294 REM INTERROG EVL PM/LDLS PM: CPT

## 2020-06-09 PROCEDURE — 93296 REM INTERROG EVL PM/IDS: CPT

## 2020-09-08 ENCOUNTER — APPOINTMENT (OUTPATIENT)
Dept: CARDIOLOGY | Facility: CLINIC | Age: 75
End: 2020-09-08

## 2020-12-08 ENCOUNTER — APPOINTMENT (OUTPATIENT)
Dept: CARDIOLOGY | Facility: CLINIC | Age: 75
End: 2020-12-08

## 2021-01-08 ENCOUNTER — APPOINTMENT (OUTPATIENT)
Dept: SURGERY | Facility: CLINIC | Age: 76
End: 2021-01-08

## 2021-01-25 ENCOUNTER — APPOINTMENT (OUTPATIENT)
Dept: CARDIOLOGY | Facility: CLINIC | Age: 76
End: 2021-01-25

## 2021-03-09 ENCOUNTER — APPOINTMENT (OUTPATIENT)
Dept: CARDIOLOGY | Facility: CLINIC | Age: 76
End: 2021-03-09

## 2021-06-08 ENCOUNTER — APPOINTMENT (OUTPATIENT)
Dept: SURGERY | Facility: CLINIC | Age: 76
End: 2021-06-08
Payer: MEDICARE

## 2021-06-08 VITALS
HEIGHT: 64 IN | HEART RATE: 80 BPM | BODY MASS INDEX: 27.31 KG/M2 | DIASTOLIC BLOOD PRESSURE: 84 MMHG | TEMPERATURE: 97.9 F | SYSTOLIC BLOOD PRESSURE: 140 MMHG | WEIGHT: 160 LBS

## 2021-06-08 PROCEDURE — 99214 OFFICE O/P EST MOD 30 MIN: CPT

## 2021-06-10 ENCOUNTER — LABORATORY RESULT (OUTPATIENT)
Age: 76
End: 2021-06-10

## 2021-06-10 ENCOUNTER — APPOINTMENT (OUTPATIENT)
Dept: SURGERY | Facility: CLINIC | Age: 76
End: 2021-06-10
Payer: MEDICARE

## 2021-06-10 ENCOUNTER — APPOINTMENT (OUTPATIENT)
Dept: PLASTIC SURGERY | Facility: CLINIC | Age: 76
End: 2021-06-10
Payer: MEDICARE

## 2021-06-10 VITALS
SYSTOLIC BLOOD PRESSURE: 128 MMHG | BODY MASS INDEX: 27.31 KG/M2 | HEART RATE: 80 BPM | DIASTOLIC BLOOD PRESSURE: 76 MMHG | HEIGHT: 64 IN | TEMPERATURE: 97.3 F | WEIGHT: 160 LBS

## 2021-06-10 PROCEDURE — 99212 OFFICE O/P EST SF 10 MIN: CPT

## 2021-06-10 PROCEDURE — 11104 PUNCH BX SKIN SINGLE LESION: CPT

## 2021-06-11 ENCOUNTER — NON-APPOINTMENT (OUTPATIENT)
Age: 76
End: 2021-06-11

## 2021-06-11 NOTE — ASSESSMENT
[FreeTextEntry1] : 75 yo  gentleman with hx of pT3pN0 , 2.5 mm thick, with ulceration, acrolentiginous melanoma of the right heel (s/p wider local resection and SLNB on December 14, 2017 with negative margins of resection. \par \par \par 6/8/2021 exam suspicious of local recurrence acral melanoma with no palpable LN, will need to do punch biopsy but will wait for his visit with dr Gonzalez since he was seen by him before to compare his current exam with his baseline\par \par We discussed possible line of management if this turn to be recurrent melanoma\par \par will see him in couple days for punch biopsy

## 2021-06-11 NOTE — CONSULT LETTER
[Dear  ___] : Dear  [unfilled], [Consult Letter:] : I had the pleasure of evaluating your patient, [unfilled]. [Please see my note below.] : Please see my note below. [Consult Closing:] : Thank you very much for allowing me to participate in the care of this patient.  If you have any questions, please do not hesitate to contact me. [Sincerely,] : Sincerely, [FreeTextEntry3] : Coy Lucero MD

## 2021-06-11 NOTE — HISTORY OF PRESENT ILLNESS
[de-identified] : YARELY HURTADO  is a pleasant 76 year year old man  who came in 06/08/2021 for follow up on his right heel melanoma excision site . He has Dr DARIUS SIDDIQUI as His PCP.\par \par 2017: he underwent excisional biopsy of a right foot hypermelanotic skin lesion. The final pathology report revealed a pT3b, pNx 2.5 mm thick, with ulceration, acrolentiginous melanoma of the heel, resected with inadequate margins. He underwent wide excision of melanoma of his right heel on December 14, 2017 with negative margins of resection. We performed a SLNB of his right groin and both lymph nodes were negative for tumor. \par \par 6/8/2021:He comes today for followup and he did not refer any major changes, however when i asked him about noticable changes in color and consistency of his wound site he acknowledged it was softer recently and possible change in color. \par

## 2021-06-11 NOTE — PHYSICAL EXAM
[Normal] : supple, no neck mass and thyroid not enlarged [Normal Neck Lymph Nodes] : normal neck lymph nodes  [Normal Supraclavicular Lymph Nodes] : normal supraclavicular lymph nodes [Normal Groin Lymph Nodes] : normal groin lymph nodes [Normal Axillary Lymph Nodes] : normal axillary lymph nodes [Normal] : oriented to person, place and time, with appropriate affect [de-identified] : no palpable groin/popliteal LNs [de-identified] : right heel wound site showed brownish pigmented soft skin with some keratosis and minor skin ulcer(possible pressure site)

## 2021-06-12 LAB
ALBUMIN SERPL ELPH-MCNC: 4.6 G/DL
ALP BLD-CCNC: 60 U/L
ALT SERPL-CCNC: 15 U/L
ANION GAP SERPL CALC-SCNC: 18 MMOL/L
AST SERPL-CCNC: 15 U/L
BILIRUB SERPL-MCNC: 0.4 MG/DL
BUN SERPL-MCNC: 15 MG/DL
CALCIUM SERPL-MCNC: 9.9 MG/DL
CHLORIDE SERPL-SCNC: 99 MMOL/L
CO2 SERPL-SCNC: 24 MMOL/L
CREAT SERPL-MCNC: 0.9 MG/DL
GLUCOSE SERPL-MCNC: 162 MG/DL
LDH SERPL-CCNC: 195 U/L
POTASSIUM SERPL-SCNC: 4.2 MMOL/L
PROT SERPL-MCNC: 6.9 G/DL
SODIUM SERPL-SCNC: 141 MMOL/L

## 2021-06-13 NOTE — HISTORY OF PRESENT ILLNESS
[de-identified] : YARELY HURTADO  is a pleasant 76 year year old man  who came in 06/08/2021 for follow up on his right heel melanoma excision site . He has Dr DARIUS SIDDIQUI as His PCP.\par \par 2017: he underwent excisional biopsy of a right foot hypermelanotic skin lesion. The final pathology report revealed a pT3b, pNx 2.5 mm thick, with ulceration, acrolentiginous melanoma of the heel, resected with inadequate margins. He underwent wide excision of melanoma of his right heel on December 14, 2017 with negative margins of resection. We performed a SLNB of his right groin and both lymph nodes were negative for tumor. \par \par 6/8/2021:He comes today for followup and he did not refer any major changes, however when i asked him about noticable changes in color and consistency of his wound site he acknowledged it was softer recently and possible change in color. \par

## 2021-06-13 NOTE — PHYSICAL EXAM
[Normal] : supple, no neck mass and thyroid not enlarged [Normal Neck Lymph Nodes] : normal neck lymph nodes  [Normal Supraclavicular Lymph Nodes] : normal supraclavicular lymph nodes [Normal Groin Lymph Nodes] : normal groin lymph nodes [Normal Axillary Lymph Nodes] : normal axillary lymph nodes [Normal] : oriented to person, place and time, with appropriate affect [de-identified] : no palpable groin/popliteal LNs [de-identified] : right heel wound site showed brownish pigmented soft skin with some keratosis and minor skin ulcer(possible pressure site)

## 2021-06-13 NOTE — PROCEDURE
[FreeTextEntry1] : PUNCH BIOPSY FROM NON-WEIGHT BEARING AREA OF HIS HEEL RECURRENT SUSPECTED MELANOMA\par LIDOCAIN/EPI WAS USED\par CONSENT IN CHART\par STERILE PRECAUTION \par

## 2021-06-13 NOTE — ASSESSMENT
[FreeTextEntry1] : 73 yo  gentleman with hx of pT3pN0 , 2.5 mm thick, with ulceration, acrolentiginous melanoma of the right heel (s/p wider local resection and SLNB on December 14, 2017 with negative margins of resection. \par \par \par 6/8/2021 exam suspicious of local recurrence acral melanoma with no palpable LN, punch bx was done 6/10/2021 as dr Gonzalez agreed that his exam is changed from before, ordered staging work up PET/MRI/LDH\par \par We discussed possible line of management if this turn to be recurrent melanoma\par \par will see him in one week for result of pathology

## 2021-06-14 ENCOUNTER — APPOINTMENT (OUTPATIENT)
Dept: CARDIOLOGY | Facility: CLINIC | Age: 76
End: 2021-06-14
Payer: MEDICARE

## 2021-06-14 VITALS
BODY MASS INDEX: 27.66 KG/M2 | HEIGHT: 64 IN | SYSTOLIC BLOOD PRESSURE: 107 MMHG | HEART RATE: 79 BPM | WEIGHT: 162 LBS | TEMPERATURE: 95.5 F | DIASTOLIC BLOOD PRESSURE: 68 MMHG

## 2021-06-14 DIAGNOSIS — I44.39 OTHER ATRIOVENTRICULAR BLOCK: ICD-10-CM

## 2021-06-14 PROCEDURE — 93280 PM DEVICE PROGR EVAL DUAL: CPT

## 2021-06-14 PROCEDURE — 99214 OFFICE O/P EST MOD 30 MIN: CPT

## 2021-06-14 RX ORDER — SIMVASTATIN 40 MG/1
40 TABLET, FILM COATED ORAL
Refills: 0 | Status: COMPLETED | COMMUNITY
End: 2021-06-14

## 2021-06-14 RX ORDER — SITAGLIPTIN AND METFORMIN HYDROCHLORIDE 50; 1000 MG/1; MG/1
TABLET, FILM COATED ORAL
Refills: 0 | Status: COMPLETED | COMMUNITY
End: 2021-06-14

## 2021-06-14 RX ORDER — METFORMIN HYDROCHLORIDE 1000 MG/1
1000 TABLET, COATED ORAL
Refills: 0 | Status: COMPLETED | COMMUNITY
End: 2021-06-14

## 2021-06-14 NOTE — PHYSICAL EXAM
[General Appearance - Well Developed] : well developed [Normal Appearance] : normal appearance [Well Groomed] : well groomed [General Appearance - Well Nourished] : well nourished [No Deformities] : no deformities [General Appearance - In No Acute Distress] : no acute distress [Heart Rate And Rhythm] : heart rate and rhythm were normal [Heart Sounds] : normal S1 and S2 [Murmurs] : no murmurs present [Respiration, Rhythm And Depth] : normal respiratory rhythm and effort [Auscultation Breath Sounds / Voice Sounds] : lungs were clear to auscultation bilaterally [Exaggerated Use Of Accessory Muscles For Inspiration] : no accessory muscle use [Left Infraclavicular] : left infraclavicular area [Clean] : clean [Dry] : dry [Well-Healed] : well-healed [Abdomen Soft] : soft [Abdomen Tenderness] : non-tender [] : no hepato-splenomegaly [Abdomen Mass (___ Cm)] : no abdominal mass palpated [Nail Clubbing] : no clubbing of the fingernails [Cyanosis, Localized] : no localized cyanosis [Petechial Hemorrhages (___cm)] : no petechial hemorrhages [Normal Conjunctiva] : the conjunctiva exhibited no abnormalities [Eyelids - No Xanthelasma] : the eyelids demonstrated no xanthelasmas [Normal Oral Mucosa] : normal oral mucosa [No Oral Pallor] : no oral pallor [No Oral Cyanosis] : no oral cyanosis [Abnormal Walk] : normal gait [Gait - Sufficient For Exercise Testing] : the gait was sufficient for exercise testing [Normal Jugular Venous A Waves Present] : normal jugular venous A waves present [Normal Jugular Venous V Waves Present] : normal jugular venous V waves present [No Jugular Venous Nair A Waves] : no jugular venous nair A waves

## 2021-06-15 LAB
BASOPHILS # BLD AUTO: 0.05 K/UL
BASOPHILS NFR BLD AUTO: 0.4 %
EOSINOPHIL # BLD AUTO: 0.15 K/UL
EOSINOPHIL NFR BLD AUTO: 1.3 %
HCT VFR BLD CALC: 45.8 %
HGB BLD-MCNC: 14.5 G/DL
IMM GRANULOCYTES NFR BLD AUTO: 0.5 %
LYMPHOCYTES # BLD AUTO: 2.97 K/UL
LYMPHOCYTES NFR BLD AUTO: 24.9 %
MAN DIFF?: NORMAL
MCHC RBC-ENTMCNC: 30.6 PG
MCHC RBC-ENTMCNC: 31.7 G/DL
MCV RBC AUTO: 96.6 FL
MONOCYTES # BLD AUTO: 0.68 K/UL
MONOCYTES NFR BLD AUTO: 5.7 %
NEUTROPHILS # BLD AUTO: 8.01 K/UL
NEUTROPHILS NFR BLD AUTO: 67.2 %
PLATELET # BLD AUTO: 297 K/UL
RBC # BLD: 4.74 M/UL
RBC # FLD: 13 %
WBC # FLD AUTO: 11.92 K/UL

## 2021-06-17 PROBLEM — I44.39 HIGH DEGREE ATRIOVENTRICULAR BLOCK: Status: ACTIVE | Noted: 2021-06-17

## 2021-06-18 ENCOUNTER — NON-APPOINTMENT (OUTPATIENT)
Age: 76
End: 2021-06-18

## 2021-06-23 ENCOUNTER — RESULT REVIEW (OUTPATIENT)
Age: 76
End: 2021-06-23

## 2021-06-24 ENCOUNTER — APPOINTMENT (OUTPATIENT)
Dept: SURGERY | Facility: CLINIC | Age: 76
End: 2021-06-24
Payer: MEDICARE

## 2021-06-24 VITALS
TEMPERATURE: 97.3 F | HEIGHT: 64 IN | WEIGHT: 163 LBS | OXYGEN SATURATION: 97 % | DIASTOLIC BLOOD PRESSURE: 84 MMHG | SYSTOLIC BLOOD PRESSURE: 150 MMHG | BODY MASS INDEX: 27.83 KG/M2 | HEART RATE: 98 BPM

## 2021-06-24 PROCEDURE — 99214 OFFICE O/P EST MOD 30 MIN: CPT

## 2021-06-25 ENCOUNTER — RESULT REVIEW (OUTPATIENT)
Age: 76
End: 2021-06-25

## 2021-06-25 ENCOUNTER — OUTPATIENT (OUTPATIENT)
Dept: OUTPATIENT SERVICES | Facility: HOSPITAL | Age: 76
LOS: 1 days | Discharge: HOME | End: 2021-06-25
Payer: MEDICARE

## 2021-06-25 ENCOUNTER — TRANSCRIPTION ENCOUNTER (OUTPATIENT)
Age: 76
End: 2021-06-25

## 2021-06-25 ENCOUNTER — OUTPATIENT (OUTPATIENT)
Dept: OUTPATIENT SERVICES | Facility: HOSPITAL | Age: 76
LOS: 1 days | Discharge: HOME | End: 2021-06-25

## 2021-06-25 ENCOUNTER — APPOINTMENT (OUTPATIENT)
Dept: HEMATOLOGY ONCOLOGY | Facility: CLINIC | Age: 76
End: 2021-06-25
Payer: MEDICARE

## 2021-06-25 VITALS
BODY MASS INDEX: 28.7 KG/M2 | HEART RATE: 93 BPM | WEIGHT: 162 LBS | RESPIRATION RATE: 16 BRPM | HEIGHT: 63 IN | TEMPERATURE: 97.8 F | DIASTOLIC BLOOD PRESSURE: 72 MMHG | SYSTOLIC BLOOD PRESSURE: 128 MMHG

## 2021-06-25 DIAGNOSIS — C43.71 MALIGNANT MELANOMA OF RIGHT LOWER LIMB, INCLUDING HIP: ICD-10-CM

## 2021-06-25 LAB — GLUCOSE BLDC GLUCOMTR-MCNC: 164 MG/DL — HIGH (ref 70–99)

## 2021-06-25 PROCEDURE — 99203 OFFICE O/P NEW LOW 30 MIN: CPT

## 2021-06-25 PROCEDURE — 78816 PET IMAGE W/CT FULL BODY: CPT | Mod: 26,PS,MH

## 2021-06-25 NOTE — REVIEW OF SYSTEMS
[Vision Problems] : vision problems [SOB on Exertion] : shortness of breath during exertion [Joint Pain] : joint pain [Joint Stiffness] : joint stiffness [Negative] : Heme/Lymph [FreeTextEntry3] : "From diabetes". Cloudy vision and "increased pressure" [FreeTextEntry9] : Right shoulder. Having therapy.

## 2021-06-25 NOTE — ASSESSMENT
[FreeTextEntry1] : Localized melanoma of the right heel, 2.5 mm in thickness, S/P surgery including SLNB of the right groin, with no evidence of residual disease. PET scan today essentially negative except for slight activity at the heel. The area was also biopsied recently and it came back mostly fibrotic tissue.\par \par The situation was discussed with the patient and his daughter-in-law who served as . \par At this time, the patient does not require any further intervention.\par His monitoring should continue with his dermatologist and surgeon. \par The patient can be referred back as indicated otherwise.\par \par All questions answered.\par

## 2021-06-25 NOTE — PHYSICAL EXAM
[Fully active, able to carry on all pre-disease performance without restriction] : Status 0 - Fully active, able to carry on all pre-disease performance without restriction [Normal] : affect appropriate [de-identified] : But slightly overweight [de-identified] : Although somewhat distant sounds [de-identified] : Arthritic changes noted [de-identified] : Multiple nevi noted (patient due to see the dermatologist)

## 2021-06-25 NOTE — HISTORY OF PRESENT ILLNESS
[de-identified] : The patient is a 76 year old male referred by Dr. ALESIA Lucero for oncological evaluation.\par The patient's history goes back to 2017 when he was diagnosed with a right foot melanoma.\par That was resected. He also had sentinel lymph node biopsy performed. That was negative for any spread. The tumor was a pT3 lesion, 2.5 mm thick, ulcerated, acrolentiginous melanoma. It was entirely resected with negative margins.\par Recently, the patient noted that the area of previous resection area was changing and some pinkish nodularity had appeared. It was not painful.\par Otherwise, the patient is denying any new other complaints.

## 2021-06-28 DIAGNOSIS — Z85.820 PERSONAL HISTORY OF MALIGNANT MELANOMA OF SKIN: ICD-10-CM

## 2021-06-28 NOTE — CONSULT LETTER
[Dear  ___] : Dear  [unfilled], [Consult Letter:] : I had the pleasure of evaluating your patient, [unfilled]. [Please see my note below.] : Please see my note below. [Consult Closing:] : Thank you very much for allowing me to participate in the care of this patient.  If you have any questions, please do not hesitate to contact me. [Sincerely,] : Sincerely, [FreeTextEntry3] : Coy Lucero MD [DrDebbi  ___] : Dr. BLANCO

## 2021-06-28 NOTE — ASSESSMENT
[FreeTextEntry1] : 75 yo  gentleman with hx of pT3pN0 , 2.5 mm thick, with ulceration, acrolentiginous melanoma of the right heel (s/p wider local resection and SLNB on December 14, 2017 with negative margins of resection. \par \par \par 6/8/2021 exam suspicious of local recurrence acral melanoma with no palpable LN, punch bx was done 6/10/2021 as dr Gonzalez agreed that his exam is changed from before,\par \par path came back as fibrosis\par PET scan 6/2021 showed FLORENTIN\par \par WILL SEE HIM IN 3-6 MONTHS

## 2021-06-28 NOTE — HISTORY OF PRESENT ILLNESS
[de-identified] : YARELY HURTADO  is a pleasant 76 year year old man  who came in 06/08/2021 for follow up on his right heel melanoma excision site . He has Dr DARIUS SIDDIQUI as His PCP.\par \par 2017: he underwent excisional biopsy of a right foot hypermelanotic skin lesion. The final pathology report revealed a pT3b, pNx 2.5 mm thick, with ulceration, acrolentiginous melanoma of the heel, resected with inadequate margins. He underwent wide excision of melanoma of his right heel on December 14, 2017 with negative margins of resection. We performed a SLNB of his right groin and both lymph nodes were negative for tumor. \par \par 6/8/2021:He comes today for followup and he did not refer any major changes, however when i asked him about noticable changes in color and consistency of his wound site he acknowledged it was softer recently and possible change in color. \par

## 2021-06-28 NOTE — PHYSICAL EXAM
[Normal] : supple, no neck mass and thyroid not enlarged [Normal Neck Lymph Nodes] : normal neck lymph nodes  [Normal Supraclavicular Lymph Nodes] : normal supraclavicular lymph nodes [Normal Groin Lymph Nodes] : normal groin lymph nodes [Normal Axillary Lymph Nodes] : normal axillary lymph nodes [Normal] : oriented to person, place and time, with appropriate affect [de-identified] : no palpable groin/popliteal LNs [de-identified] : right heel wound site showed brownish pigmented soft skin with some keratosis and minor skin ulcer(possible pressure site)

## 2021-07-01 ENCOUNTER — NON-APPOINTMENT (OUTPATIENT)
Age: 76
End: 2021-07-01

## 2021-08-22 NOTE — DISCUSSION/SUMMARY
[FreeTextEntry1] : Mr. Noe Topete is a 76 year-old man with hypertension, diabetes mellitus, skin melanoma s/p skin graft, presenting with high degree AV block, 2:1 AV block with escape rhythm in 40 bpm. He has minimal symptoms. He is not on AV guillermo agents and he has no apparent reversible cause of AV block. He has normal LV function on echo. He underwent the placement of a dual chamber pacemaker and removal of implantable loop recorder on 2/1/2018. \par \par Device interrogation: Normal Dual Chamber PPM function. \par Episodes of  PAF.  AF burden <1%His CHADSVASC score is 4. \par He is on Eliquis. Denies s/s bleeding .\par \par He is enrolled in remote  monitoring services and transmitting appropriately.  \par \par Reports c/w meds . On Eliquis for stroke prevention . Denies any s/s bleeding. \par  \par \par Plan \par -Continue current medication regimen \par -Continue remote monitoring  \par -RTO in 9 months \par -ECHO as scheduled with Dr. Franoc on 6/21\par \par \par Please do not hesitate to contact us at [phone] if you have any further questions regarding this patient care.\par

## 2021-08-22 NOTE — HISTORY OF PRESENT ILLNESS
[Palpitations] : no palpitations [SOB] : no dyspnea [Syncope] : no syncope [Dizziness] : no dizziness [Chest Pain] : no chest pain or discomfort [Shoulder Pain] : no shoulder pain [Pain at Site] : no pain at device site [Erythema at Site] : no erythema at device site [de-identified] : Referring Physician: Dr. eNw Franco\par PCP: Dr. Merritt Stone\par \par Presents  for routine device interrogation . \par \par Feels well, no CP/SOB or palpitations . NO dizziness or syncope . Stable activity tolerance. \par \par CARDIAC TESTING:\par ECG ( 6/14/2021) 79bpm V paced NJ 220ms\par ECG ( 3/9/2020) ; SR at 77 bpm, RV paced  \par ECG (5/20/2019): sinus rhythm, first degree av block, intermittent RV pacing\par ECG (3/5/2018): sinus rhythm at 84 bm, 1st degree AV block, NJ>400 ms [Swelling at Site] : no swelling at device site

## 2021-08-22 NOTE — END OF VISIT
[FreeTextEntry3] : I was present with the NP/PA during the history and exam of the patient. I discussed patient's management with her in details.I reviewed the NP’s note and agree with the documented findings and plan of care. I would like to take this opportunity to thank you for involving me in this patient care. Please do not hesitate to contact me if you have any further questions at [phone].\par  [Time Spent: ___ minutes] : I have spent [unfilled] minutes of time on the encounter.

## 2021-08-22 NOTE — PROCEDURE
[No] : not [See Scanned Paceart Report] : See scanned paceart report [See Device Printout] : See device printout [DDD] : DDD [Voltage: ___ volts] : Voltage was [unfilled] volts [Magnet Rate: ___ Ppm] : magnet rate was [unfilled] Ppm [Longevity: ___ months] : The estimated remaining battery life is [unfilled] months [Normal] : The battery status is normal. [Threshold Testing Performed] : Threshold testing was performed [Lead Imp:  ___ohms] : lead impedance was [unfilled] ohms [Sensing Amplitude ___mv] : sensing amplitude was [unfilled] mv [___V @] : [unfilled] V [___ ms] : [unfilled] ms [Programmed for Longevity] : output reprogrammed for improved battery longevity [Counters Reset] : the counters were reset [Asense-Vsense ___ %] : Asense-Vsense [unfilled]% [Asense-Vpace ___ %] : Asense-Vpace [unfilled]% [Apace-Vsense ___ %] : Apace-Vsense [unfilled]% [Apace-Vpace ___ %] : Apace-Vpace [unfilled]% [de-identified] : 1AV block  [de-identified] : [facility] [de-identified] : [id number] [de-identified] : 2029 [de-identified] :  [de-identified] : 02/01/2018 [de-identified] : \par

## 2021-11-26 NOTE — HISTORY OF PRESENT ILLNESS
[de-identified] : 75 yo male patient who comes today after he underwent excisional biopsy of a right foot hypermelanotic skin lesion. The final pathology report revealed a pT3b, pNx 2.5 mm thick, with ulceration, acrolentiginous melanoma of the heel, resected with inadequate margins. He underwent wide excision of melanoma of his right heel on December 14, 2017 with negative margins of resection. We performed a SLNB of his right groin and both lymph nodes were negative for tumor. He comes today for followup and he refers minor discomfort in his foot when he walks. Today he complains of pain in his distal right leg, denies any other symptoms.
English

## 2022-03-14 ENCOUNTER — NON-APPOINTMENT (OUTPATIENT)
Age: 77
End: 2022-03-14

## 2022-03-14 ENCOUNTER — APPOINTMENT (OUTPATIENT)
Dept: CARDIOLOGY | Facility: CLINIC | Age: 77
End: 2022-03-14
Payer: MEDICARE

## 2022-03-14 VITALS — SYSTOLIC BLOOD PRESSURE: 144 MMHG | DIASTOLIC BLOOD PRESSURE: 82 MMHG | HEART RATE: 73 BPM | TEMPERATURE: 97.9 F

## 2022-03-14 DIAGNOSIS — R00.1 BRADYCARDIA, UNSPECIFIED: ICD-10-CM

## 2022-03-14 PROCEDURE — 93280 PM DEVICE PROGR EVAL DUAL: CPT

## 2022-03-14 PROCEDURE — 99213 OFFICE O/P EST LOW 20 MIN: CPT

## 2022-03-14 PROCEDURE — 93000 ELECTROCARDIOGRAM COMPLETE: CPT | Mod: 59

## 2022-03-14 NOTE — PROCEDURE
[de-identified] : 1AV block 40s [de-identified] : [facility] [de-identified] : 2934 [de-identified] : [id number] [de-identified] : 02/01/2018 [de-identified] :  [de-identified] : 4 AF episodes, longest 20 hours 35 minutes with controlled rates AF burden <1%\par Normal device function\par

## 2022-03-14 NOTE — END OF VISIT
[FreeTextEntry3] : I was present with the NP/PA during the history and exam of the patient. I discussed patient's management with her in details.I reviewed the NP’s note and agree with the documented findings and plan of care. I would like to take this opportunity to thank you for involving me in this patient care. Please do not hesitate to contact me if you have any further questions at [phone].\par

## 2022-03-14 NOTE — DISCUSSION/SUMMARY
[FreeTextEntry1] : Mr. Noe Topete is a 76 year-old man with hypertension, diabetes mellitus, skin melanoma s/p skin graft, presenting with high degree AV block, 2:1 AV block with escape rhythm in 40 bpm. He has minimal symptoms. He is not on AV guillermo agents and he has no apparent reversible cause of AV block. He has normal LV function on echo. He underwent the placement of a dual chamber pacemaker and removal of implantable loop recorder on 2/1/2018. \par \par Device interrogation: Normal Dual Chamber PPM function. Chronic low R waves, 2.2mV - 3.0mV during testing today.\par Episodes of  PAF.  AF burden <1%. His CHADSVASC score is 4. \par He is on Eliquis. Denies s/s bleeding . CBC, CMP ordered. \par Had recent ECHO with Dr. Franco - records requested.\par \par He is enrolled in remote  monitoring services however no transmission since June 2020. Patient has two home monitors, one in [place] and one in [facility] - seems that this may be the issue. [employer] tech support information provided.  \par \par Plan to renew Eliquis after blood work comes back. BP acceptable.\par  \par Plan \par -Continue current medication regimen \par -Continue remote monitoring  \par -RTO in 6 months \par \par \par Please do not hesitate to contact us at [phone] if you have any further questions regarding this patient care.\par

## 2022-03-14 NOTE — CARDIOLOGY SUMMARY
[de-identified] : 3/14/22: Sinus rhythm, V-paced 73 bpm\par ECG ( 6/14/2021) 79bpm V paced MO 220ms\par ECG ( 3/9/2020) ; SR at 77 bpm, RV paced  \par ECG (5/20/2019): sinus rhythm, first degree av block, intermittent RV pacing\par ECG (3/5/2018): sinus rhythm at 84 bm, 1st degree AV block, MO>400 ms

## 2022-03-14 NOTE — HISTORY OF PRESENT ILLNESS
[Palpitations] : no palpitations [SOB] : no dyspnea [Syncope] : no syncope [Dizziness] : no dizziness [Chest Pain] : no chest pain or discomfort [Shoulder Pain] : no shoulder pain [Pain at Site] : no pain at device site [Erythema at Site] : no erythema at device site [Swelling at Site] : no swelling at device site [de-identified] : Referring Physician: Dr. New Franco\par PCP: Dr. Merritt Stone\par Patient is Luxembourgish speaking. Prefers to have his daughter translate for him.\par \par Presents  for routine device interrogation . \par \par Feels well, no CP/SOB or palpitations . NO dizziness or syncope . Stable activity tolerance.

## 2022-03-15 ENCOUNTER — APPOINTMENT (OUTPATIENT)
Dept: SURGERY | Facility: CLINIC | Age: 77
End: 2022-03-15
Payer: MEDICARE

## 2022-03-15 VITALS
SYSTOLIC BLOOD PRESSURE: 151 MMHG | OXYGEN SATURATION: 97 % | HEART RATE: 84 BPM | BODY MASS INDEX: 28.7 KG/M2 | HEIGHT: 63 IN | TEMPERATURE: 97.3 F | DIASTOLIC BLOOD PRESSURE: 69 MMHG | WEIGHT: 162 LBS

## 2022-03-15 PROCEDURE — 99214 OFFICE O/P EST MOD 30 MIN: CPT

## 2022-03-17 LAB
ALBUMIN SERPL ELPH-MCNC: 4.6 G/DL
ALP BLD-CCNC: 65 U/L
ALT SERPL-CCNC: 22 U/L
ANION GAP SERPL CALC-SCNC: 16 MMOL/L
AST SERPL-CCNC: 22 U/L
BASOPHILS # BLD AUTO: 0.03 K/UL
BASOPHILS NFR BLD AUTO: 0.3 %
BILIRUB SERPL-MCNC: <0.2 MG/DL
BUN SERPL-MCNC: 14 MG/DL
CALCIUM SERPL-MCNC: 9.8 MG/DL
CHLORIDE SERPL-SCNC: 99 MMOL/L
CO2 SERPL-SCNC: 25 MMOL/L
CREAT SERPL-MCNC: 0.9 MG/DL
EGFR: 88 ML/MIN/1.73M2
EOSINOPHIL # BLD AUTO: 0.11 K/UL
EOSINOPHIL NFR BLD AUTO: 0.9 %
GLUCOSE SERPL-MCNC: 184 MG/DL
HCT VFR BLD CALC: 43.1 %
HGB BLD-MCNC: 13.8 G/DL
IMM GRANULOCYTES NFR BLD AUTO: 0.5 %
LYMPHOCYTES # BLD AUTO: 2.6 K/UL
LYMPHOCYTES NFR BLD AUTO: 22.1 %
MAN DIFF?: NORMAL
MCHC RBC-ENTMCNC: 30.4 PG
MCHC RBC-ENTMCNC: 32 G/DL
MCV RBC AUTO: 94.9 FL
MONOCYTES # BLD AUTO: 0.87 K/UL
MONOCYTES NFR BLD AUTO: 7.4 %
NEUTROPHILS # BLD AUTO: 8.08 K/UL
NEUTROPHILS NFR BLD AUTO: 68.8 %
PLATELET # BLD AUTO: 263 K/UL
POTASSIUM SERPL-SCNC: 5.6 MMOL/L
PROT SERPL-MCNC: 6.9 G/DL
RBC # BLD: 4.54 M/UL
RBC # FLD: 13.1 %
SODIUM SERPL-SCNC: 140 MMOL/L
WBC # FLD AUTO: 11.75 K/UL

## 2022-03-20 NOTE — PHYSICAL EXAM
[Normal] : supple, no neck mass and thyroid not enlarged [Normal Neck Lymph Nodes] : normal neck lymph nodes  [Normal Supraclavicular Lymph Nodes] : normal supraclavicular lymph nodes [Normal Groin Lymph Nodes] : normal groin lymph nodes [Normal Axillary Lymph Nodes] : normal axillary lymph nodes [Normal] : oriented to person, place and time, with appropriate affect [de-identified] : no palpable groin/popliteal LNs [de-identified] : right heel wound site showed brownish pigmented soft skin with some keratosis and minor skin ulcer(possible pressure site)

## 2022-03-20 NOTE — ASSESSMENT
[FreeTextEntry1] : 75 yo  gentleman with hx of pT3pN0 , 2.5 mm thick, with ulceration, acrolentiginous melanoma of the right heel (s/p wider local resection and SLNB on December 14, 2017 with negative margins of resection. \par \par \par 6/8/2021 exam suspicious of local recurrence acral melanoma with no palpable LN, punch bx was done 6/10/2021 as dr Gonzalez agreed that his exam is changed from before,\par \par path came back as fibrosis\par PET scan 6/2021 showed FLORENTIN\par \par 6/23/2021 biopsy  of his heel pigmented tissue showed no melanoma \par \par 3/15/2022 no changes in his heel scar , exam showed no changes no palpable LN in popliteal or groin, will see him in 6 months

## 2022-03-20 NOTE — HISTORY OF PRESENT ILLNESS
[de-identified] : YARELY HURTADO  is a pleasant 76 year year old man  who came in 06/08/2021 for follow up on his right heel melanoma excision site . He has Dr DARIUS SIDDIQUI as His PCP.\par \par 2017: he underwent excisional biopsy of a right foot hypermelanotic skin lesion. The final pathology report revealed a pT3b, pNx 2.5 mm thick, with ulceration, acrolentiginous melanoma of the heel, resected with inadequate margins. He underwent wide excision of melanoma of his right heel on December 14, 2017 with negative margins of resection. We performed a SLNB of his right groin and both lymph nodes were negative for tumor. \par \par 6/8/2021:He comes today for followup and he did not refer any major changes, however when i asked him about noticable changes in color and consistency of his wound site he acknowledged it was softer recently and possible change in color. \par 6/23/2021 biopsy  of his heel pigmented tissue showed no melanoma \par \par 3/15/2022 no changes in his heel scar

## 2022-03-24 ENCOUNTER — APPOINTMENT (OUTPATIENT)
Dept: PLASTIC SURGERY | Facility: CLINIC | Age: 77
End: 2022-03-24
Payer: MEDICARE

## 2022-03-24 PROCEDURE — 99212 OFFICE O/P EST SF 10 MIN: CPT

## 2022-03-24 NOTE — ASSESSMENT
[FreeTextEntry1] : Here in followup s/p right heel reconstruction after excision of melanoma w Viv Dec 2017\par \par graft healed--small area epidermal cracking approx 3mm\par getting diabetic shoes w Mura later today\par \par suggest medihoney to small opening rigth heel\par no acute plastic surgery issues\par f/u in 6 months\par \par as above\par right heel fine, well healed STSG over Interga 2 yrs postop\par has firmness and tenderness over distal lateral rigth calf\par no popliteal or inguinal LAD\par \par Antwan ordered CT scan yesterday\par recently saw Viv\par \par pt is to f/u w Antwan re CT results\par \par \par 6/10/2021\par as above\par pt s/p excision of right heel melanoma 2.5mm thick in Dec 2017 by Viv w recon w Integra and skin graft by me\par \par Pt has a pigmented lesion in center of skin graft that is raised, approx 3mm in sieze, and has two areas og pigmentation suspicious for recurrent melanoma\par no popliteal LAD appreciated\par \par D/W Dr Lucero while pt is in my office--Jazmine plannign on punch biopsy of lesion later today--I agree wi this plan as I am highly suspicious for recurrence.\par \par Explained to pt and his daughter via --all ?s asnwered\par \par Due to COVID 19, pre-visit patient instructions were explained to the patient and their symptoms were checked upon arrival.  \par Masks were used by the health care providers and staff and the examination room was cleaned after the patient visit was completed.\par \par Photos taken with patient permission.\par \par \par 3/24/2022\par as above\par pt w no issues\par right heel skin graft stable--no suspicious findings on physical exam, no pigmented lesions\par recently saw Jazmine who was happy w his exam and imaging w/u (negative PET scan 6/2021 and biopsy showing fibrosis 6/2021)\par \par f/u 6 months\par \par Due to COVID 19, pre-visit patient instructions were explained to the patient and their symptoms were checked upon arrival.  \par Masks were used by the health care providers and staff and the examination room was cleaned after the patient visit was completed.\par \par

## 2022-04-04 ENCOUNTER — APPOINTMENT (OUTPATIENT)
Dept: CARDIOLOGY | Facility: CLINIC | Age: 77
End: 2022-04-04
Payer: MEDICARE

## 2022-04-04 ENCOUNTER — NON-APPOINTMENT (OUTPATIENT)
Age: 77
End: 2022-04-04

## 2022-04-04 PROCEDURE — 93294 REM INTERROG EVL PM/LDLS PM: CPT

## 2022-04-04 PROCEDURE — 93296 REM INTERROG EVL PM/IDS: CPT

## 2022-04-11 NOTE — PROCEDURE
[No] : not [See Scanned Paceart Report] : See scanned paceart report [See Device Printout] : See device printout [DDD] : DDD [Voltage: ___ volts] : Voltage was [unfilled] volts [Magnet Rate: ___ Ppm] : magnet rate was [unfilled] Ppm [Longevity: ___ months] : The estimated remaining battery life is [unfilled] months [Normal] : The battery status is normal. [Threshold Testing Performed] : Threshold testing was performed [Lead Imp:  ___ohms] : lead impedance was [unfilled] ohms [Sensing Amplitude ___mv] : sensing amplitude was [unfilled] mv [___V @] : [unfilled] V [___ ms] : [unfilled] ms [Programmed for Longevity] : output reprogrammed for improved battery longevity [Counters Reset] : the counters were reset [Asense-Vsense ___ %] : Asense-Vsense [unfilled]% [Asense-Vpace ___ %] : Asense-Vpace [unfilled]% [Apace-Vsense ___ %] : Apace-Vsense [unfilled]% [Apace-Vpace ___ %] : Apace-Vpace [unfilled]% [de-identified] : 1AV block  [de-identified] : [facility] [de-identified] : 3184 [de-identified] : [id number] [de-identified] : 02/01/2018 [de-identified] :  [de-identified] : Several AF episodes lasting up to  20 hours with controlled rates\par Normal device function

## 2022-05-03 ENCOUNTER — APPOINTMENT (OUTPATIENT)
Dept: DERMATOLOGY | Facility: CLINIC | Age: 77
End: 2022-05-03

## 2022-09-13 ENCOUNTER — APPOINTMENT (OUTPATIENT)
Dept: SURGERY | Facility: CLINIC | Age: 77
End: 2022-09-13

## 2022-09-22 ENCOUNTER — APPOINTMENT (OUTPATIENT)
Dept: PLASTIC SURGERY | Facility: CLINIC | Age: 77
End: 2022-09-22

## 2023-02-22 ENCOUNTER — FORM ENCOUNTER (OUTPATIENT)
Age: 78
End: 2023-02-22

## 2023-02-23 ENCOUNTER — APPOINTMENT (OUTPATIENT)
Dept: CARDIOLOGY | Facility: CLINIC | Age: 78
End: 2023-02-23

## 2023-03-24 ENCOUNTER — APPOINTMENT (OUTPATIENT)
Dept: CARDIOLOGY | Facility: CLINIC | Age: 78
End: 2023-03-24

## 2023-04-06 ENCOUNTER — NON-APPOINTMENT (OUTPATIENT)
Age: 78
End: 2023-04-06

## 2023-04-06 ENCOUNTER — APPOINTMENT (OUTPATIENT)
Dept: CARDIOLOGY | Facility: CLINIC | Age: 78
End: 2023-04-06
Payer: MEDICARE

## 2023-04-06 PROCEDURE — 93296 REM INTERROG EVL PM/IDS: CPT

## 2023-04-06 PROCEDURE — 93294 REM INTERROG EVL PM/LDLS PM: CPT

## 2023-04-11 ENCOUNTER — APPOINTMENT (OUTPATIENT)
Dept: SURGERY | Facility: CLINIC | Age: 78
End: 2023-04-11
Payer: MEDICARE

## 2023-04-11 VITALS
OXYGEN SATURATION: 96 % | WEIGHT: 160 LBS | TEMPERATURE: 96.8 F | HEART RATE: 86 BPM | HEIGHT: 63 IN | BODY MASS INDEX: 28.35 KG/M2 | DIASTOLIC BLOOD PRESSURE: 84 MMHG | SYSTOLIC BLOOD PRESSURE: 130 MMHG

## 2023-04-11 PROCEDURE — 99213 OFFICE O/P EST LOW 20 MIN: CPT

## 2023-04-11 NOTE — ASSESSMENT
[FreeTextEntry1] : 75 yo  gentleman with hx of pT3pN0 , 2.5 mm thick, with ulceration, acrolentiginous melanoma of the right heel (s/p wider local resection and SLNB on December 14, 2017 with negative margins of resection. \par \par \par 6/8/2021 exam suspicious of local recurrence acral melanoma with no palpable LN, punch bx was done 6/10/2021 as dr Gonzalez agreed that his exam is changed from before,\par \par path came back as fibrosis\par PET scan 6/2021 showed FLORENTIN\par \par 6/23/2021 biopsy  of his heel pigmented tissue showed no melanoma \par \par 3/15/2022 no changes in his heel scar , exam showed no changes no palpable LN in popliteal or groin, will see him in 6 months\par \par 4/11/2023: no reported symptoms, exam is same pigmented skin graft taking area, no palpable LN, will see him in one year

## 2023-04-11 NOTE — PHYSICAL EXAM
[Normal] : supple, no neck mass and thyroid not enlarged [Normal Neck Lymph Nodes] : normal neck lymph nodes  [Normal Supraclavicular Lymph Nodes] : normal supraclavicular lymph nodes [Normal Groin Lymph Nodes] : normal groin lymph nodes [Normal Axillary Lymph Nodes] : normal axillary lymph nodes [Normal] : oriented to person, place and time, with appropriate affect [de-identified] : no palpable groin/popliteal LNs [de-identified] : right heel wound site showed brownish pigmented soft skin with some keratosis and minor skin ulcer(possible pressure site)

## 2023-04-11 NOTE — HISTORY OF PRESENT ILLNESS
[de-identified] : YARELY HURTADO  is a pleasant 76 year year old man  who came in 06/08/2021 for follow up on his right heel melanoma excision site . He has Dr DARIUS SIDDIQUI as His PCP.\par \par 2017: he underwent excisional biopsy of a right foot hypermelanotic skin lesion. The final pathology report revealed a pT3b, pNx 2.5 mm thick, with ulceration, acrolentiginous melanoma of the heel, resected with inadequate margins. He underwent wide excision of melanoma of his right heel on December 14, 2017 with negative margins of resection. We performed a SLNB of his right groin and both lymph nodes were negative for tumor. \par \par 6/8/2021:He comes today for followup and he did not refer any major changes, however when i asked him about noticable changes in color and consistency of his wound site he acknowledged it was softer recently and possible change in color. \par 6/23/2021 biopsy  of his heel pigmented tissue showed no melanoma \par \par 3/15/2022 no changes in his heel scar \par 4/11/2023: no reported symptoms

## 2023-04-13 ENCOUNTER — APPOINTMENT (OUTPATIENT)
Dept: PLASTIC SURGERY | Facility: CLINIC | Age: 78
End: 2023-04-13
Payer: MEDICARE

## 2023-04-13 PROCEDURE — 99212 OFFICE O/P EST SF 10 MIN: CPT

## 2023-04-19 ENCOUNTER — NON-APPOINTMENT (OUTPATIENT)
Age: 78
End: 2023-04-19

## 2023-04-24 ENCOUNTER — APPOINTMENT (OUTPATIENT)
Dept: ELECTROPHYSIOLOGY | Facility: CLINIC | Age: 78
End: 2023-04-24
Payer: MEDICARE

## 2023-04-24 VITALS
WEIGHT: 158 LBS | DIASTOLIC BLOOD PRESSURE: 77 MMHG | TEMPERATURE: 98 F | HEIGHT: 63 IN | HEART RATE: 59 BPM | BODY MASS INDEX: 28 KG/M2 | SYSTOLIC BLOOD PRESSURE: 129 MMHG

## 2023-04-24 DIAGNOSIS — E78.00 PURE HYPERCHOLESTEROLEMIA, UNSPECIFIED: ICD-10-CM

## 2023-04-24 DIAGNOSIS — Z45.018 ENCOUNTER FOR ADJUSTMENT AND MANAGEMENT OF OTHER PART OF CARDIAC PACEMAKER: ICD-10-CM

## 2023-04-24 DIAGNOSIS — Z95.0 PRESENCE OF CARDIAC PACEMAKER: ICD-10-CM

## 2023-04-24 DIAGNOSIS — I10 ESSENTIAL (PRIMARY) HYPERTENSION: ICD-10-CM

## 2023-04-24 DIAGNOSIS — I48.0 PAROXYSMAL ATRIAL FIBRILLATION: ICD-10-CM

## 2023-04-24 PROCEDURE — 93280 PM DEVICE PROGR EVAL DUAL: CPT

## 2023-04-24 PROCEDURE — 99214 OFFICE O/P EST MOD 30 MIN: CPT

## 2023-04-24 PROCEDURE — 93000 ELECTROCARDIOGRAM COMPLETE: CPT | Mod: 59

## 2023-04-24 RX ORDER — SITAGLIPTIN AND METFORMIN HYDROCHLORIDE 50; 1000 MG/1; MG/1
50-1000 TABLET, FILM COATED ORAL TWICE DAILY
Refills: 0 | Status: COMPLETED | COMMUNITY
End: 2023-04-24

## 2023-04-24 RX ORDER — DAPAGLIFLOZIN 5 MG/1
5 TABLET, FILM COATED ORAL DAILY
Refills: 0 | Status: ACTIVE | COMMUNITY

## 2023-04-24 RX ORDER — TELMISARTAN 20 MG/1
TABLET ORAL DAILY
Refills: 0 | Status: ACTIVE | COMMUNITY

## 2023-04-24 RX ORDER — APIXABAN 5 MG/1
5 TABLET, FILM COATED ORAL TWICE DAILY
Qty: 180 | Refills: 0 | Status: ACTIVE | COMMUNITY
Start: 2018-03-05

## 2023-04-24 RX ORDER — INSULIN DETEMIR 100 [IU]/ML
100 INJECTION, SOLUTION SUBCUTANEOUS TWICE DAILY
Refills: 0 | Status: ACTIVE | COMMUNITY

## 2023-04-24 RX ORDER — PREGABALIN 150 MG/1
150 CAPSULE ORAL TWICE DAILY
Refills: 0 | Status: ACTIVE | COMMUNITY

## 2023-04-24 RX ORDER — INSULIN ASPART 100 [IU]/ML
INJECTION, SOLUTION INTRAVENOUS; SUBCUTANEOUS DAILY
Refills: 0 | Status: ACTIVE | COMMUNITY

## 2023-04-24 RX ORDER — AMLODIPINE BESYLATE 5 MG/1
5 TABLET ORAL DAILY
Refills: 0 | Status: ACTIVE | COMMUNITY

## 2023-04-24 RX ORDER — ATORVASTATIN CALCIUM 20 MG/1
20 TABLET, FILM COATED ORAL DAILY
Refills: 0 | Status: ACTIVE | COMMUNITY

## 2023-04-24 NOTE — PHYSICAL EXAM
[General Appearance - Well Developed] : well developed [Normal Appearance] : normal appearance [Well Groomed] : well groomed [General Appearance - Well Nourished] : well nourished [General Appearance - In No Acute Distress] : no acute distress [Heart Sounds] : normal S1 and S2 [Heart Rate And Rhythm] : heart rate and rhythm were normal [Murmurs] : no murmurs present [] : no respiratory distress [Auscultation Breath Sounds / Voice Sounds] : lungs were clear to auscultation bilaterally [Left Infraclavicular] : left infraclavicular area [Clean] : clean [Well-Healed] : well-healed [Dry] : dry [Nail Clubbing] : no clubbing of the fingernails [Cyanosis, Localized] : no localized cyanosis [Petechial Hemorrhages (___cm)] : no petechial hemorrhages [Abnormal Walk] : normal gait [Gait - Sufficient For Exercise Testing] : the gait was sufficient for exercise testing

## 2023-04-26 ENCOUNTER — APPOINTMENT (OUTPATIENT)
Dept: CARDIOLOGY | Facility: CLINIC | Age: 78
End: 2023-04-26

## 2023-06-23 NOTE — REVIEW OF SYSTEMS
[SOB] : no shortness of breath [Dyspnea on exertion] : not dyspnea during exertion [Chest Discomfort] : no chest discomfort [Palpitations] : no palpitations [Orthopnea] : no orthopnea [Syncope] : no syncope [Cough] : no cough [Wheezing] : no wheezing [Easy Bleeding] : no tendency for easy bleeding [Easy Bruising] : no tendency for easy bruising

## 2023-06-23 NOTE — PROCEDURE
[No] : not [See Scanned Paceart Report] : See scanned paceart report [See Device Printout] : See device printout [DDD] : DDD [Voltage: ___ volts] : Voltage was [unfilled] volts [Magnet Rate: ___ Ppm] : magnet rate was [unfilled] Ppm [Longevity: ___ months] : The estimated remaining battery life is [unfilled] months [Normal] : The battery status is normal. [Threshold Testing Performed] : Threshold testing was performed [Lead Imp:  ___ohms] : lead impedance was [unfilled] ohms [Sensing Amplitude ___mv] : sensing amplitude was [unfilled] mv [___V @] : [unfilled] V [___ ms] : [unfilled] ms [Programmed for Longevity] : output reprogrammed for improved battery longevity [Counters Reset] : the counters were reset [Asense-Vsense ___ %] : Asense-Vsense [unfilled]% [Asense-Vpace ___ %] : Asense-Vpace [unfilled]% [Apace-Vsense ___ %] : Apace-Vsense [unfilled]% [Apace-Vpace ___ %] : Apace-Vpace [unfilled]% [Pacemaker] : pacemaker [Atrial Fibrillation] : atrial fibrillation [de-identified] : [facility] [de-identified] : 1232 [de-identified] : [id number] [de-identified] : 02/01/2018 [de-identified] :  [de-identified] : AP: 8.4%\par : 92%\par AF episode since 4/21/2023 ongoing\par Normal device function\par Pt on Remote\par

## 2023-06-23 NOTE — HISTORY OF PRESENT ILLNESS
[Palpitations] : no palpitations [SOB] : no dyspnea [Syncope] : no syncope [Dizziness] : no dizziness [Chest Pain] : no chest pain or discomfort [Shoulder Pain] : no shoulder pain [Pain at Site] : no pain at device site [Erythema at Site] : no erythema at device site [Swelling at Site] : no swelling at device site [de-identified] : Referring Physician: Dr. New Franco\par PCP: Dr. Merritt Stone\par \par The patient presents for a device follow-up interrogation.\par He denies chest pain, dyspnea, palpitations, dizziness or syncope.\par \par CARDIAC TESTING:\par \par ECG 04/24/2023: Atrial fibrillation 59 bpm, V paced rhythm\par ECG ( 6/14/2021) 79bpm V paced OR 220ms\par ECG ( 3/9/2020) ; SR at 77 bpm, RV paced  \par ECG (5/20/2019): sinus rhythm, first degree av block, intermittent RV pacing\par ECG (3/5/2018): sinus rhythm at 84 bm, 1st degree AV block, OR>400 ms

## 2023-06-23 NOTE — DISCUSSION/SUMMARY
[FreeTextEntry1] : Mr. Noe Topete is a 78 year-old man with paroxysmal atrial fibrillation, hypertension, diabetes mellitus, hyperlipidemia, skin melanoma s/p skin graft, presenting with high degree AV block, 2:1 AV block with escape rhythm in 40 bpm, for which underwent the placement of a dual chamber pacemaker on February 1, 2018, presents for follow-up visit.\par \par Device interrogation: Normal Dual Chamber PPM function. \par Episodes of ongoing  AF swince 04/21/2023. His CHADSVASC score is 4. \par He is on Eliquis and is tolerating it well, no bleeding issues..\par \par He is enrolled in remote  monitoring services and transmitting appropriately.  \par \par  Plan \par -Continue current medication regimen \par -Continue remote monitoring  \par -RTO in 4 months and discuss the option of cardioversion if the patient is still in A. fib. (He is scheduled to travel back to [facility] tomorrow and will return in 8 months). He is planning to bring his home monitor with him and has a site in [facility] to interrogate the device). \par The patient will follow-up with us when he returns from [facility].\par \par \par \par

## 2023-07-06 ENCOUNTER — NON-APPOINTMENT (OUTPATIENT)
Age: 78
End: 2023-07-06

## 2023-07-06 ENCOUNTER — APPOINTMENT (OUTPATIENT)
Dept: CARDIOLOGY | Facility: CLINIC | Age: 78
End: 2023-07-06
Payer: MEDICARE

## 2023-07-06 PROCEDURE — 93296 REM INTERROG EVL PM/IDS: CPT

## 2023-07-06 PROCEDURE — 93294 REM INTERROG EVL PM/LDLS PM: CPT

## 2023-10-09 ENCOUNTER — NON-APPOINTMENT (OUTPATIENT)
Age: 78
End: 2023-10-09

## 2023-10-09 ENCOUNTER — APPOINTMENT (OUTPATIENT)
Dept: CARDIOLOGY | Facility: CLINIC | Age: 78
End: 2023-10-09
Payer: MEDICARE

## 2023-10-10 PROCEDURE — 93296 REM INTERROG EVL PM/IDS: CPT

## 2023-10-10 PROCEDURE — 93294 REM INTERROG EVL PM/LDLS PM: CPT

## 2024-01-08 ENCOUNTER — APPOINTMENT (OUTPATIENT)
Dept: CARDIOLOGY | Facility: CLINIC | Age: 79
End: 2024-01-08
Payer: MEDICARE

## 2024-01-08 ENCOUNTER — NON-APPOINTMENT (OUTPATIENT)
Age: 79
End: 2024-01-08

## 2024-01-08 PROCEDURE — 93296 REM INTERROG EVL PM/IDS: CPT

## 2024-01-08 PROCEDURE — 93294 REM INTERROG EVL PM/LDLS PM: CPT

## 2024-02-06 ENCOUNTER — APPOINTMENT (OUTPATIENT)
Dept: PLASTIC SURGERY | Facility: CLINIC | Age: 79
End: 2024-02-06
Payer: MEDICARE

## 2024-02-06 DIAGNOSIS — C43.71 MALIGNANT MELANOMA OF RIGHT LOWER LIMB, INCLUDING HIP: ICD-10-CM

## 2024-02-06 PROCEDURE — 99212 OFFICE O/P EST SF 10 MIN: CPT

## 2024-02-06 NOTE — PHYSICAL EXAM
[de-identified] : elderly male, NAD [de-identified] :  Right heel skin graft well-healed and mature, no evidence of recurrence Right posterior thigh with epithelialized donor site

## 2024-02-06 NOTE — PHYSICAL EXAM
[de-identified] : elderly male, NAD [de-identified] :  Right heel skin graft well-healed and mature, no evidence of recurrence Right posterior thigh with epithelialized donor site

## 2024-02-06 NOTE — HISTORY OF PRESENT ILLNESS
[FreeTextEntry1] : 73 yo [place]-speaking diabetic male with approximately 7 year h/o left plantar heel pigmented skin lesion biopsy proven to be invasive melanoma, 2-4 mm in thickness with in-situ component 0. 2 mm from the nearest margin. Patient presents today for initial evaluation. Denies any prior h/o of skin cancer or any family h/o skin malignancy.   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 Nam). Patient is here for pre-operative evaluation for heel reconstruction.  Interval hx (1/23/18). PT here POD#12 s/p application of Integra with NPWT. Patient presents today and reports no significant complaints. Denies any fever, chills or bleeding.  Interval hx (2/7/18) Pt is now POD#7 s/p STSG to right heel from the right thigh. Patient is doing very well and denies any significant complaints. Taking oral antibiotics as prescribed, keeping the leg elevated and walking with toe-touch WB. Denies fever, chills or bleeding from the site.  Interval hx (2/15/18) Pt is 2 weeks s/p STSG to right heel from the right thigh. Patient is doing very well and denies any significant complaints. Completed oral antibiotics as prescribed, keeping the leg elevated and walking with toe-touch WB. Denies fever, chills or bleeding from the site.  Interval hx (5/16/19) PT here for f/u s/p right heel reconstruction after excision of melanoma w Viv Dec 2017. Doing well.   Interval hx (3/3/20) Pt here for f/u s/p right heel reconstruction after excision of melanoma w Viv Dec 2017. Doing well but c/o firmness and tenderness over distal lateral calf. Awaiting CT scan per podiatry Dr. Moncada.   Interval hx (6/10/21) Pt s/p excision of right heel melanoma 2.5mm thick in Dec 2017 by Viv w recon w Integra and skin graft by me. Pt has a pigmented lesion in center of skin graft that is raised, approx 3mm in size, and has two areas of pigmentation suspicious for recurrent melanoma. D/W Dr Lucero while pt is in my office--Jazmine planning on punch biopsy of lesion later today--I agree wi this plan as I am highly suspicious for recurrence.  Interval hx (3/24/22). Pt w no issues. Right heel skin graft stable--no suspicious findings on physical exam, no pigmented lesions recently saw Jazmine who was happy w his exam and imaging w/u (negative PET scan 6/2021 and biopsy showing fibrosis 6/2021).  Interval hx (4/13/23). Pt is here for f/u. right heel stable, recent bx by Jazmine--fibrosis. PET scan negative He is happy pt is FLORENTIN and rec yearly f/u.  Interval hx (2/6/24). Pt is here for annual f/u. Doing well and happy with no new concerns.

## 2024-02-06 NOTE — ASSESSMENT
[FreeTextEntry1] : Here in followup s/p right heel reconstruction after excision of melanoma w Viv Dec 2017  graft healed--small area epidermal cracking approx 3mm getting diabetic shoes w Clarisa later today  suggest medihoney to small opening rigth heel no acute plastic surgery issues f/u in 6 months  as above right heel fine, well healed STSG over Interga 2 yrs postop has firmness and tenderness over distal lateral rigth calf no popliteal or inguinal LAD  Antwan ordered CT scan yesterday recently saw Viv  pt is to f/u w Antwan re CT results   6/10/2021 as above pt s/p excision of right heel melanoma 2.5mm thick in Dec 2017 by Viv w recon w Integra and skin graft by me  Pt has a pigmented lesion in center of skin graft that is raised, approx 3mm in sieze, and has two areas og pigmentation suspicious for recurrent melanoma no popliteal LAD appreciated  D/W Dr Lucero while pt is in my office--Jazmine plannign on punch biopsy of lesion later today--I agree wi this plan as I am highly suspicious for recurrence.  Explained to pt and his daughter via --all ?s asnwered  Due to COVID 19, pre-visit patient instructions were explained to the patient and their symptoms were checked upon arrival.   Masks were used by the health care providers and staff and the examination room was cleaned after the patient visit was completed.  Photos taken with patient permission.   3/24/2022 as above pt w no issues right heel skin graft stable--no suspicious findings on physical exam, no pigmented lesions recently saw Jazmine who was happy w his exam and imaging w/u (negative PET scan 6/2021 and biopsy showing fibrosis 6/2021)  f/u 6 months  Due to COVID 19, pre-visit patient instructions were explained to the patient and their symptoms were checked upon arrival.   Masks were used by the health care providers and staff and the examination room was cleaned after the patient visit was completed.   4/13/2023 right heel stable recent bx by Jazmine--fibrosis PET scan negative he is heppy pt is FLORENTIN and rec yearly f/u  I agree--no recurrence 6 yrs after initial resection  yearly surveillance.  2/6/2024 as above s/p excision of right heel melanoma Dec 2017 recon w Integra and skin graft doing well no evidence of recurrence wound well healed, no issues wearing shoes/sneakers normally ambulates without issue  cont derm surveillance cont surg once surveillance  f/u w me in one year  all ?s answered

## 2024-02-06 NOTE — HISTORY OF PRESENT ILLNESS
[FreeTextEntry1] : 73 yo [place]-speaking diabetic male with approximately 7 year h/o left plantar heel pigmented skin lesion biopsy proven to be invasive melanoma, 2-4 mm in thickness with in-situ component 0. 2 mm from the nearest margin. Patient presents today for initial evaluation. Denies any prior h/o of skin cancer or any family h/o skin malignancy.   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 Nam). Patient is here for pre-operative evaluation for heel reconstruction.  Interval hx (1/23/18). PT here POD#12 s/p application of Integra with NPWT. Patient presents today and reports no significant complaints. Denies any fever, chills or bleeding.  Interval hx (2/7/18) Pt is now POD#7 s/p STSG to right heel from the right thigh. Patient is doing very well and denies any significant complaints. Taking oral antibiotics as prescribed, keeping the leg elevated and walking with toe-touch WB. Denies fever, chills or bleeding from the site.  Interval hx (2/15/18) Pt is 2 weeks s/p STSG to right heel from the right thigh. Patient is doing very well and denies any significant complaints. Completed oral antibiotics as prescribed, keeping the leg elevated and walking with toe-touch WB. Denies fever, chills or bleeding from the site.  Interval hx (5/16/19) PT here for f/u s/p right heel reconstruction after excision of melanoma w Viv Dec 2017. Doing well.   Interval hx (3/3/20) Pt here for f/u s/p right heel reconstruction after excision of melanoma w Viv Dec 2017. Doing well but c/o firmness and tenderness over distal lateral calf. Awaiting CT scan per podiatry Dr. Moncada.   Interval hx (6/10/21) Pt s/p excision of right heel melanoma 2.5mm thick in Dec 2017 by Viv w recon w Integra and skin graft by me. Pt has a pigmented lesion in center of skin graft that is raised, approx 3mm in size, and has two areas of pigmentation suspicious for recurrent melanoma. D/W Dr Lucero while pt is in my office--Jazmine planning on punch biopsy of lesion later today--I agree wi this plan as I am highly suspicious for recurrence.  Interval hx (3/24/22). Pt w no issues. Right heel skin graft stable--no suspicious findings on physical exam, no pigmented lesions recently saw Jazmine who was happy w his exam and imaging w/u (negative PET scan 6/2021 and biopsy showing fibrosis 6/2021).  Interval hx (4/13/23). Pt is here for f/u. right heel stable, recent bx by Jazmine--fibrosis. PET scan negative He is happy pt is FLORENTIN and rec yearly f/u.  Interval hx (2/6/24). Pt is here for annual f/u. Doing well and happy with no new concerns.

## 2024-02-06 NOTE — ASSESSMENT
[FreeTextEntry1] : 79 yo M with h/o right heel melanoma 2.5mm thick in Dec 2017 s/p WLE by Dr. Nam w recon w Integra and skin graft. Doing well.   no evidence of recurrence wound well healed, no issues wearing shoes/sneakers normally ambulates without issue  cont derm surveillance cont surg once surveillance  f/u w me in one year  all ?s answered.

## 2024-02-20 ENCOUNTER — APPOINTMENT (OUTPATIENT)
Dept: SURGERY | Facility: CLINIC | Age: 79
End: 2024-02-20
Payer: MEDICARE

## 2024-02-20 VITALS
WEIGHT: 161 LBS | HEIGHT: 63 IN | SYSTOLIC BLOOD PRESSURE: 110 MMHG | DIASTOLIC BLOOD PRESSURE: 74 MMHG | BODY MASS INDEX: 28.53 KG/M2 | OXYGEN SATURATION: 95 % | HEART RATE: 74 BPM | TEMPERATURE: 97 F

## 2024-02-20 DIAGNOSIS — C43.9 MALIGNANT MELANOMA OF SKIN, UNSPECIFIED: ICD-10-CM

## 2024-02-20 PROCEDURE — 99213 OFFICE O/P EST LOW 20 MIN: CPT

## 2024-02-29 PROBLEM — C43.9: Status: ACTIVE | Noted: 2021-06-10

## 2024-02-29 NOTE — ASSESSMENT
[FreeTextEntry1] : 73 yo  gentleman with hx of pT3pN0 , 2.5 mm thick, with ulceration, acrolentiginous melanoma of the right heel (s/p wider local resection and SLNB on December 14, 2017 with negative margins of resection.    6/8/2021 exam suspicious of local recurrence acral melanoma with no palpable LN, punch bx was done 6/10/2021 as dr Gonzalez agreed that his exam is changed from before,  path came back as fibrosis PET scan 6/2021 showed FLORENTIN  6/23/2021 biopsy  of his heel pigmented tissue showed no melanoma   3/15/2022 no changes in his heel scar , exam showed no changes no palpable LN in popliteal or groin, will see him in 6 months  2/202/2024: exam is unchanged, no skin changes no swelling in lymph node basin, instructed to follow up with his plastic and dermatologist   the above plan of care with discussed in details to the patient and all questions were answered to patient satisfaction. patient instructed to follow up with the referring physician and patient primary care provider   A total of 20 minutes was spent on this visit, obtaining h/p,  reviewing previous notes/imaging, counseling the patient on f/u , ordering tests (below), and documenting the findings in the note.

## 2024-02-29 NOTE — CONSULT LETTER
[Dear  ___] : Dear  [unfilled], [Consult Letter:] : I had the pleasure of evaluating your patient, [unfilled]. [Please see my note below.] : Please see my note below. [Sincerely,] : Sincerely, [Consult Closing:] : Thank you very much for allowing me to participate in the care of this patient.  If you have any questions, please do not hesitate to contact me. [FreeTextEntry3] : Coy Lucero MD [DrDebbi  ___] : Dr. BLANCO

## 2024-02-29 NOTE — HISTORY OF PRESENT ILLNESS
[de-identified] : YARELY HURTADO  is a pleasant 76 year year old man  who came in 06/08/2021 for follow up on his right heel melanoma excision site . He has Dr DARIUS SIDDIQUI as His PCP.  2017: he underwent excisional biopsy of a right foot hypermelanotic skin lesion. The final pathology report revealed a pT3b, pNx 2.5 mm thick, with ulceration, acrolentiginous melanoma of the heel, resected with inadequate margins. He underwent wide excision of melanoma of his right heel on December 14, 2017 with negative margins of resection. We performed a SLNB of his right groin and both lymph nodes were negative for tumor.   6/8/2021:He comes today for followup and he did not refer any major changes, however when i asked him about noticable changes in color and consistency of his wound site he acknowledged it was softer recently and possible change in color.  6/23/2021 biopsy  of his heel pigmented tissue showed no melanoma   3/15/2022 no changes in his heel scar

## 2024-02-29 NOTE — PHYSICAL EXAM
[Normal] : supple, no neck mass and thyroid not enlarged [Normal Neck Lymph Nodes] : normal neck lymph nodes  [Normal Supraclavicular Lymph Nodes] : normal supraclavicular lymph nodes [Normal Groin Lymph Nodes] : normal groin lymph nodes [Normal Axillary Lymph Nodes] : normal axillary lymph nodes [Normal] : oriented to person, place and time, with appropriate affect [de-identified] : no palpable groin/popliteal LNs [de-identified] : right heel wound site showed brownish pigmented soft skin with some keratosis and minor skin ulcer(possible pressure site)

## 2024-04-08 ENCOUNTER — NON-APPOINTMENT (OUTPATIENT)
Age: 79
End: 2024-04-08

## 2024-04-08 ENCOUNTER — APPOINTMENT (OUTPATIENT)
Dept: CARDIOLOGY | Facility: CLINIC | Age: 79
End: 2024-04-08
Payer: MEDICARE

## 2024-04-08 PROCEDURE — 93294 REM INTERROG EVL PM/LDLS PM: CPT

## 2024-04-08 PROCEDURE — 93296 REM INTERROG EVL PM/IDS: CPT

## 2024-07-08 ENCOUNTER — NON-APPOINTMENT (OUTPATIENT)
Age: 79
End: 2024-07-08

## 2024-07-08 ENCOUNTER — APPOINTMENT (OUTPATIENT)
Dept: CARDIOLOGY | Facility: CLINIC | Age: 79
End: 2024-07-08
Payer: MEDICARE

## 2024-07-08 PROCEDURE — 93294 REM INTERROG EVL PM/LDLS PM: CPT

## 2024-07-08 PROCEDURE — 93296 REM INTERROG EVL PM/IDS: CPT

## 2024-10-07 ENCOUNTER — APPOINTMENT (OUTPATIENT)
Dept: ELECTROPHYSIOLOGY | Facility: CLINIC | Age: 79
End: 2024-10-07
Payer: MEDICARE

## 2024-10-07 VITALS
TEMPERATURE: 97.1 F | DIASTOLIC BLOOD PRESSURE: 70 MMHG | SYSTOLIC BLOOD PRESSURE: 166 MMHG | BODY MASS INDEX: 27.82 KG/M2 | HEIGHT: 63 IN | WEIGHT: 157 LBS

## 2024-10-07 VITALS — HEART RATE: 61 BPM

## 2024-10-07 DIAGNOSIS — I44.39 OTHER ATRIOVENTRICULAR BLOCK: ICD-10-CM

## 2024-10-07 DIAGNOSIS — I48.19 OTHER PERSISTENT ATRIAL FIBRILLATION: ICD-10-CM

## 2024-10-07 DIAGNOSIS — R00.1 BRADYCARDIA, UNSPECIFIED: ICD-10-CM

## 2024-10-07 PROCEDURE — 99214 OFFICE O/P EST MOD 30 MIN: CPT

## 2024-10-07 PROCEDURE — 93280 PM DEVICE PROGR EVAL DUAL: CPT

## 2024-10-07 PROCEDURE — 93000 ELECTROCARDIOGRAM COMPLETE: CPT | Mod: 59

## 2025-01-06 ENCOUNTER — NON-APPOINTMENT (OUTPATIENT)
Age: 80
End: 2025-01-06

## 2025-01-06 ENCOUNTER — APPOINTMENT (OUTPATIENT)
Dept: CARDIOLOGY | Facility: CLINIC | Age: 80
End: 2025-01-06
Payer: MEDICARE

## 2025-01-06 PROCEDURE — 93294 REM INTERROG EVL PM/LDLS PM: CPT

## 2025-01-06 PROCEDURE — 93296 REM INTERROG EVL PM/IDS: CPT

## 2025-04-07 ENCOUNTER — NON-APPOINTMENT (OUTPATIENT)
Age: 80
End: 2025-04-07

## 2025-04-07 ENCOUNTER — APPOINTMENT (OUTPATIENT)
Dept: CARDIOLOGY | Facility: CLINIC | Age: 80
End: 2025-04-07

## 2025-04-07 PROCEDURE — 93296 REM INTERROG EVL PM/IDS: CPT

## 2025-04-07 PROCEDURE — 93294 REM INTERROG EVL PM/LDLS PM: CPT

## 2025-05-13 ENCOUNTER — APPOINTMENT (OUTPATIENT)
Dept: SURGERY | Facility: CLINIC | Age: 80
End: 2025-05-13
Payer: MEDICARE

## 2025-05-13 VITALS
OXYGEN SATURATION: 98 % | BODY MASS INDEX: 27.64 KG/M2 | TEMPERATURE: 97 F | WEIGHT: 156 LBS | DIASTOLIC BLOOD PRESSURE: 74 MMHG | HEIGHT: 63 IN | SYSTOLIC BLOOD PRESSURE: 122 MMHG | HEART RATE: 72 BPM

## 2025-05-13 DIAGNOSIS — C43.71 MALIGNANT MELANOMA OF RIGHT LOWER LIMB, INCLUDING HIP: ICD-10-CM

## 2025-05-13 PROCEDURE — 99213 OFFICE O/P EST LOW 20 MIN: CPT

## 2025-07-07 ENCOUNTER — APPOINTMENT (OUTPATIENT)
Dept: ELECTROPHYSIOLOGY | Facility: CLINIC | Age: 80
End: 2025-07-07
Payer: MEDICARE

## 2025-07-07 VITALS
SYSTOLIC BLOOD PRESSURE: 125 MMHG | DIASTOLIC BLOOD PRESSURE: 60 MMHG | WEIGHT: 158 LBS | BODY MASS INDEX: 28 KG/M2 | HEIGHT: 63 IN | HEART RATE: 61 BPM

## 2025-07-07 PROCEDURE — 93000 ELECTROCARDIOGRAM COMPLETE: CPT | Mod: 59

## 2025-07-07 PROCEDURE — 93280 PM DEVICE PROGR EVAL DUAL: CPT

## 2025-07-07 PROCEDURE — 99214 OFFICE O/P EST MOD 30 MIN: CPT

## 2025-08-19 ENCOUNTER — APPOINTMENT (OUTPATIENT)
Dept: ELECTROPHYSIOLOGY | Facility: CLINIC | Age: 80
End: 2025-08-19

## 2025-08-19 VITALS
BODY MASS INDEX: 28.35 KG/M2 | WEIGHT: 160 LBS | HEIGHT: 63 IN | HEART RATE: 71 BPM | DIASTOLIC BLOOD PRESSURE: 60 MMHG | SYSTOLIC BLOOD PRESSURE: 134 MMHG

## 2025-08-19 DIAGNOSIS — Z95.0 PRESENCE OF CARDIAC PACEMAKER: ICD-10-CM

## 2025-08-19 DIAGNOSIS — I44.39 OTHER ATRIOVENTRICULAR BLOCK: ICD-10-CM

## 2025-08-19 DIAGNOSIS — I48.19 OTHER PERSISTENT ATRIAL FIBRILLATION: ICD-10-CM

## 2025-08-19 DIAGNOSIS — Z45.018 ENCOUNTER FOR ADJUSTMENT AND MANAGEMENT OF OTHER PART OF CARDIAC PACEMAKER: ICD-10-CM

## 2025-08-19 PROCEDURE — 93000 ELECTROCARDIOGRAM COMPLETE: CPT | Mod: 59

## 2025-08-19 PROCEDURE — 93280 PM DEVICE PROGR EVAL DUAL: CPT

## 2025-08-19 PROCEDURE — 99215 OFFICE O/P EST HI 40 MIN: CPT
